# Patient Record
Sex: FEMALE | Race: WHITE | NOT HISPANIC OR LATINO | Employment: FULL TIME | URBAN - METROPOLITAN AREA
[De-identification: names, ages, dates, MRNs, and addresses within clinical notes are randomized per-mention and may not be internally consistent; named-entity substitution may affect disease eponyms.]

---

## 2017-02-15 ENCOUNTER — OFFICE VISIT - HEALTHEAST (OUTPATIENT)
Dept: FAMILY MEDICINE | Facility: CLINIC | Age: 27
End: 2017-02-15

## 2017-02-15 DIAGNOSIS — J02.9 ACUTE PHARYNGITIS: ICD-10-CM

## 2017-02-15 ASSESSMENT — MIFFLIN-ST. JEOR: SCORE: 1517.7

## 2017-03-01 ENCOUNTER — OFFICE VISIT - HEALTHEAST (OUTPATIENT)
Dept: FAMILY MEDICINE | Facility: CLINIC | Age: 27
End: 2017-03-01

## 2017-03-01 DIAGNOSIS — J06.9 ACUTE URI: ICD-10-CM

## 2017-06-01 ENCOUNTER — OFFICE VISIT - HEALTHEAST (OUTPATIENT)
Dept: FAMILY MEDICINE | Facility: CLINIC | Age: 27
End: 2017-06-01

## 2017-06-01 DIAGNOSIS — Z12.4 PAP SMEAR FOR CERVICAL CANCER SCREENING: ICD-10-CM

## 2017-06-01 DIAGNOSIS — Z00.00 ROUTINE GENERAL MEDICAL EXAMINATION AT A HEALTH CARE FACILITY: ICD-10-CM

## 2017-06-01 ASSESSMENT — MIFFLIN-ST. JEOR: SCORE: 1535.28

## 2017-06-02 LAB
CHOLEST SERPL-MCNC: 160 MG/DL
FASTING STATUS PATIENT QL REPORTED: YES
HDLC SERPL-MCNC: 49 MG/DL
LDLC SERPL CALC-MCNC: 91 MG/DL
TRIGL SERPL-MCNC: 102 MG/DL

## 2017-06-07 ENCOUNTER — AMBULATORY - HEALTHEAST (OUTPATIENT)
Dept: FAMILY MEDICINE | Facility: CLINIC | Age: 27
End: 2017-06-07

## 2017-06-07 LAB
BKR LAB AP ABNORMAL BLEEDING: NO
BKR LAB AP BIRTH CONTROL/HORMONES: NORMAL
BKR LAB AP CERVICAL APPEARANCE: NORMAL
BKR LAB AP GYN ADEQUACY: NORMAL
BKR LAB AP GYN INTERPRETATION: NORMAL
BKR LAB AP HPV REFLEX: NORMAL
BKR LAB AP LMP: NORMAL
BKR LAB AP PATIENT STATUS: NORMAL
BKR LAB AP PREVIOUS ABNORMAL: NORMAL
BKR LAB AP PREVIOUS NORMAL: 2015
HIGH RISK?: NO
PATH REPORT.COMMENTS IMP SPEC: NORMAL
RESULT FLAG (HE HISTORICAL CONVERSION): NORMAL

## 2017-07-06 ENCOUNTER — AMBULATORY - HEALTHEAST (OUTPATIENT)
Dept: FAMILY MEDICINE | Facility: CLINIC | Age: 27
End: 2017-07-06

## 2017-07-06 ENCOUNTER — COMMUNICATION - HEALTHEAST (OUTPATIENT)
Dept: FAMILY MEDICINE | Facility: CLINIC | Age: 27
End: 2017-07-06

## 2017-07-06 DIAGNOSIS — R10.2 PELVIC PAIN: ICD-10-CM

## 2017-07-07 ENCOUNTER — HOSPITAL ENCOUNTER (OUTPATIENT)
Dept: ULTRASOUND IMAGING | Facility: CLINIC | Age: 27
Discharge: HOME | End: 2017-07-07
Attending: FAMILY MEDICINE

## 2017-07-07 DIAGNOSIS — R10.2 PELVIC PAIN: ICD-10-CM

## 2017-07-13 ENCOUNTER — OFFICE VISIT - HEALTHEAST (OUTPATIENT)
Dept: FAMILY MEDICINE | Facility: CLINIC | Age: 27
End: 2017-07-13

## 2017-07-13 DIAGNOSIS — N91.2 AMENORRHEA: ICD-10-CM

## 2018-03-01 ENCOUNTER — OFFICE VISIT - HEALTHEAST (OUTPATIENT)
Dept: FAMILY MEDICINE | Facility: CLINIC | Age: 28
End: 2018-03-01

## 2018-03-01 DIAGNOSIS — R30.0 BURNING WITH URINATION: ICD-10-CM

## 2018-03-01 DIAGNOSIS — N76.0 VAGINITIS: ICD-10-CM

## 2018-03-01 LAB
ALBUMIN UR-MCNC: NEGATIVE MG/DL
APPEARANCE UR: CLEAR
BILIRUB UR QL STRIP: NEGATIVE
CLUE CELLS: NORMAL
COLOR UR AUTO: YELLOW
GLUCOSE UR STRIP-MCNC: NEGATIVE MG/DL
HGB UR QL STRIP: NEGATIVE
KETONES UR STRIP-MCNC: NEGATIVE MG/DL
LEUKOCYTE ESTERASE UR QL STRIP: NEGATIVE
NITRATE UR QL: NEGATIVE
PH UR STRIP: 7 [PH] (ref 5–8)
SP GR UR STRIP: 1.01 (ref 1–1.03)
TRICHOMONAS, WET PREP: NORMAL
UROBILINOGEN UR STRIP-ACNC: NORMAL
YEAST, WET PREP: NORMAL

## 2018-03-01 ASSESSMENT — MIFFLIN-ST. JEOR: SCORE: 1571.57

## 2018-03-04 LAB — BACTERIA SPEC CULT: ABNORMAL

## 2018-03-05 ENCOUNTER — COMMUNICATION - HEALTHEAST (OUTPATIENT)
Dept: FAMILY MEDICINE | Facility: CLINIC | Age: 28
End: 2018-03-05

## 2018-03-05 ENCOUNTER — AMBULATORY - HEALTHEAST (OUTPATIENT)
Dept: FAMILY MEDICINE | Facility: CLINIC | Age: 28
End: 2018-03-05

## 2018-07-17 ENCOUNTER — COMMUNICATION - HEALTHEAST (OUTPATIENT)
Dept: FAMILY MEDICINE | Facility: CLINIC | Age: 28
End: 2018-07-17

## 2018-07-17 ENCOUNTER — OFFICE VISIT - HEALTHEAST (OUTPATIENT)
Dept: FAMILY MEDICINE | Facility: CLINIC | Age: 28
End: 2018-07-17

## 2018-07-17 DIAGNOSIS — N92.6 IRREGULAR PERIODS: ICD-10-CM

## 2018-07-17 DIAGNOSIS — N97.9 INFERTILITY, FEMALE: ICD-10-CM

## 2018-07-17 LAB
ALBUMIN SERPL-MCNC: 4.3 G/DL (ref 3.5–5)
ALP SERPL-CCNC: 56 U/L (ref 45–120)
ALT SERPL W P-5'-P-CCNC: 21 U/L (ref 0–45)
ANION GAP SERPL CALCULATED.3IONS-SCNC: 14 MMOL/L (ref 5–18)
AST SERPL W P-5'-P-CCNC: 15 U/L (ref 0–40)
BILIRUB SERPL-MCNC: 0.5 MG/DL (ref 0–1)
BUN SERPL-MCNC: 11 MG/DL (ref 8–22)
CALCIUM SERPL-MCNC: 9.7 MG/DL (ref 8.5–10.5)
CHLORIDE BLD-SCNC: 105 MMOL/L (ref 98–107)
CO2 SERPL-SCNC: 23 MMOL/L (ref 22–31)
CREAT SERPL-MCNC: 0.7 MG/DL (ref 0.6–1.1)
ERYTHROCYTE [DISTWIDTH] IN BLOOD BY AUTOMATED COUNT: 11.6 % (ref 11–14.5)
FSH SERPL-ACNC: 3.3 MIU/ML
GFR SERPL CREATININE-BSD FRML MDRD: >60 ML/MIN/1.73M2
GLUCOSE BLD-MCNC: 90 MG/DL (ref 70–125)
HCG SERPL QL: NEGATIVE
HCT VFR BLD AUTO: 45.4 % (ref 35–47)
HGB BLD-MCNC: 15.2 G/DL (ref 12–16)
LH SERPL-ACNC: 3.9 MIU/ML
MCH RBC QN AUTO: 29.1 PG (ref 27–34)
MCHC RBC AUTO-ENTMCNC: 33.4 G/DL (ref 32–36)
MCV RBC AUTO: 87 FL (ref 80–100)
PLATELET # BLD AUTO: 222 THOU/UL (ref 140–440)
PMV BLD AUTO: 8.8 FL (ref 7–10)
POTASSIUM BLD-SCNC: 4 MMOL/L (ref 3.5–5)
PROT SERPL-MCNC: 7.8 G/DL (ref 6–8)
RBC # BLD AUTO: 5.21 MILL/UL (ref 3.8–5.4)
SODIUM SERPL-SCNC: 142 MMOL/L (ref 136–145)
TSH SERPL DL<=0.005 MIU/L-ACNC: 2.85 UIU/ML (ref 0.3–5)
WBC: 6.4 THOU/UL (ref 4–11)

## 2018-07-19 LAB — DHEA-S SERPL-MCNC: 416 UG/DL (ref 65–380)

## 2018-07-20 ENCOUNTER — AMBULATORY - HEALTHEAST (OUTPATIENT)
Dept: LAB | Facility: CLINIC | Age: 28
End: 2018-07-20

## 2018-07-20 DIAGNOSIS — N92.6 IRREGULAR PERIODS: ICD-10-CM

## 2018-07-20 LAB
ESTRADIOL SERPL-MCNC: 21 PG/ML
FSH SERPL-ACNC: 5 MIU/ML
PROLACTIN SERPL-MCNC: 19.5 NG/ML (ref 0–20)
SHBG SERPL-SCNC: 42 NMOL/L (ref 30–135)
TESTOST FREE SERPL-MCNC: 0.36 NG/DL (ref 0.08–0.74)
TESTOST SERPL-MCNC: 24 NG/DL (ref 8–60)

## 2018-07-22 LAB — MIS SERPL-MCNC: 8.47 NG/ML (ref 0.4–16.02)

## 2018-07-23 ENCOUNTER — HOSPITAL ENCOUNTER (OUTPATIENT)
Dept: RADIOLOGY | Facility: CLINIC | Age: 28
Discharge: HOME | End: 2018-07-23
Attending: FAMILY MEDICINE | Admitting: RADIOLOGY

## 2018-07-23 DIAGNOSIS — N92.6 IRREGULAR PERIODS: ICD-10-CM

## 2018-07-29 ENCOUNTER — OFFICE VISIT - HEALTHEAST (OUTPATIENT)
Dept: FAMILY MEDICINE | Facility: CLINIC | Age: 28
End: 2018-07-29

## 2018-07-29 DIAGNOSIS — L03.011 CELLULITIS OF RIGHT THUMB: ICD-10-CM

## 2018-09-16 ENCOUNTER — OFFICE VISIT - HEALTHEAST (OUTPATIENT)
Dept: FAMILY MEDICINE | Facility: CLINIC | Age: 28
End: 2018-09-16

## 2018-09-16 DIAGNOSIS — L25.9 CONTACT DERMATITIS: ICD-10-CM

## 2018-09-18 ENCOUNTER — OFFICE VISIT - HEALTHEAST (OUTPATIENT)
Dept: FAMILY MEDICINE | Facility: CLINIC | Age: 28
End: 2018-09-18

## 2018-09-18 ENCOUNTER — COMMUNICATION - HEALTHEAST (OUTPATIENT)
Dept: FAMILY MEDICINE | Facility: CLINIC | Age: 28
End: 2018-09-18

## 2018-09-18 DIAGNOSIS — H02.846 SWELLING OF EYELID, LEFT: ICD-10-CM

## 2018-09-18 DIAGNOSIS — H10.32 ACUTE CONJUNCTIVITIS OF LEFT EYE: ICD-10-CM

## 2018-09-20 ENCOUNTER — COMMUNICATION - HEALTHEAST (OUTPATIENT)
Dept: FAMILY MEDICINE | Facility: CLINIC | Age: 28
End: 2018-09-20

## 2019-07-31 ENCOUNTER — RECORDS - HEALTHEAST (OUTPATIENT)
Dept: ADMINISTRATIVE | Facility: OTHER | Age: 29
End: 2019-07-31

## 2019-08-07 ENCOUNTER — OFFICE VISIT - HEALTHEAST (OUTPATIENT)
Dept: RHEUMATOLOGY | Facility: CLINIC | Age: 29
End: 2019-08-07

## 2019-08-07 DIAGNOSIS — H20.9 IRITIS OF LEFT EYE: ICD-10-CM

## 2019-08-07 LAB
ALBUMIN SERPL-MCNC: 4.2 G/DL (ref 3.5–5)
ALT SERPL W P-5'-P-CCNC: 17 U/L (ref 0–45)
BASOPHILS # BLD AUTO: 0 THOU/UL (ref 0–0.2)
BASOPHILS NFR BLD AUTO: 1 % (ref 0–2)
CREAT SERPL-MCNC: 0.66 MG/DL (ref 0.6–1.1)
EOSINOPHIL # BLD AUTO: 0.1 THOU/UL (ref 0–0.4)
EOSINOPHIL NFR BLD AUTO: 1 % (ref 0–6)
ERYTHROCYTE [DISTWIDTH] IN BLOOD BY AUTOMATED COUNT: 11.1 % (ref 11–14.5)
GFR SERPL CREATININE-BSD FRML MDRD: >60 ML/MIN/1.73M2
HCT VFR BLD AUTO: 43.2 % (ref 35–47)
HGB BLD-MCNC: 14.6 G/DL (ref 12–16)
LYMPHOCYTES # BLD AUTO: 1.7 THOU/UL (ref 0.8–4.4)
LYMPHOCYTES NFR BLD AUTO: 28 % (ref 20–40)
MCH RBC QN AUTO: 29.8 PG (ref 27–34)
MCHC RBC AUTO-ENTMCNC: 33.8 G/DL (ref 32–36)
MCV RBC AUTO: 88 FL (ref 80–100)
MONOCYTES # BLD AUTO: 0.4 THOU/UL (ref 0–0.9)
MONOCYTES NFR BLD AUTO: 7 % (ref 2–10)
NEUTROPHILS # BLD AUTO: 3.7 THOU/UL (ref 2–7.7)
NEUTROPHILS NFR BLD AUTO: 63 % (ref 50–70)
PLATELET # BLD AUTO: 186 THOU/UL (ref 140–440)
PMV BLD AUTO: 9.6 FL (ref 7–10)
RBC # BLD AUTO: 4.89 MILL/UL (ref 3.8–5.4)
RHEUMATOID FACT SERPL-ACNC: <15 IU/ML (ref 0–30)
WBC: 5.9 THOU/UL (ref 4–11)

## 2019-08-07 ASSESSMENT — MIFFLIN-ST. JEOR: SCORE: 1557.96

## 2019-08-08 LAB
ANA SER QL: 0.3 U
B BURGDOR IGG+IGM SER QL: 0.04 INDEX VALUE
CCP AB SER IA-ACNC: <0.5 U/ML

## 2019-08-09 ENCOUNTER — RECORDS - HEALTHEAST (OUTPATIENT)
Dept: ADMINISTRATIVE | Facility: OTHER | Age: 29
End: 2019-08-09

## 2019-08-09 LAB
ANCA IGG TITR SER IF: NORMAL {TITER}
GAMMA INTERFERON BACKGROUND BLD IA-ACNC: 0.03 IU/ML
M TB IFN-G BLD-IMP: NEGATIVE
MITOGEN IGNF BCKGRD COR BLD-ACNC: -0.01 IU/ML
MITOGEN IGNF BCKGRD COR BLD-ACNC: -0.02 IU/ML
QTF INTERPRETATION: NORMAL
QTF MITOGEN - NIL: >10 IU/ML

## 2019-08-13 LAB
B LOCUS: NORMAL
B27TEST METHOD: NORMAL

## 2019-11-14 ENCOUNTER — OFFICE VISIT - HEALTHEAST (OUTPATIENT)
Dept: RHEUMATOLOGY | Facility: CLINIC | Age: 29
End: 2019-11-14

## 2019-11-14 DIAGNOSIS — H20.9 IRITIS: ICD-10-CM

## 2019-11-14 ASSESSMENT — MIFFLIN-ST. JEOR: SCORE: 1565.22

## 2020-06-19 ENCOUNTER — COMMUNICATION - HEALTHEAST (OUTPATIENT)
Dept: SCHEDULING | Facility: CLINIC | Age: 30
End: 2020-06-19

## 2020-06-19 DIAGNOSIS — N64.4 BREAST PAIN: ICD-10-CM

## 2020-07-06 ENCOUNTER — RECORDS - HEALTHEAST (OUTPATIENT)
Dept: ADMINISTRATIVE | Facility: OTHER | Age: 30
End: 2020-07-06

## 2020-10-13 ENCOUNTER — OFFICE VISIT - HEALTHEAST (OUTPATIENT)
Dept: FAMILY MEDICINE | Facility: CLINIC | Age: 30
End: 2020-10-13

## 2020-10-13 DIAGNOSIS — Z20.822 SUSPECTED 2019 NOVEL CORONAVIRUS INFECTION: ICD-10-CM

## 2020-10-14 ENCOUNTER — RECORDS - HEALTHEAST (OUTPATIENT)
Dept: LAB | Facility: CLINIC | Age: 30
End: 2020-10-14

## 2020-10-14 LAB
SARS-COV-2 PCR COMMENT: NORMAL
SARS-COV-2 RNA SPEC QL NAA+PROBE: NEGATIVE
SARS-COV-2 VIRUS SPECIMEN SOURCE: NORMAL

## 2020-11-03 ENCOUNTER — RECORDS - HEALTHEAST (OUTPATIENT)
Dept: LAB | Facility: CLINIC | Age: 30
End: 2020-11-03

## 2020-11-03 LAB
SARS-COV-2 PCR COMMENT: ABNORMAL
SARS-COV-2 RNA SPEC QL NAA+PROBE: POSITIVE
SARS-COV-2 VIRUS SPECIMEN SOURCE: ABNORMAL

## 2021-03-01 ENCOUNTER — HOSPITAL ENCOUNTER (OUTPATIENT)
Dept: OBGYN | Facility: HOSPITAL | Age: 31
Discharge: HOME | End: 2021-03-01
Attending: OBSTETRICS & GYNECOLOGY | Admitting: OBSTETRICS & GYNECOLOGY

## 2021-03-01 LAB
ABO/RH(D): NORMAL
ALBUMIN UR-MCNC: NEGATIVE MG/DL
ALT SERPL W P-5'-P-CCNC: 30 U/L (ref 0–45)
ANTIBODY SCREEN: NEGATIVE
APTT PPP: 27 SECONDS (ref 24–37)
AST SERPL W P-5'-P-CCNC: 19 U/L (ref 0–40)
CREAT SERPL-MCNC: 0.52 MG/DL (ref 0.6–1.1)
CREAT UR-MCNC: 20.7 MG/DL
ERYTHROCYTE [DISTWIDTH] IN BLOOD BY AUTOMATED COUNT: 14 % (ref 11–14.5)
GFR SERPL CREATININE-BSD FRML MDRD: >60 ML/MIN/1.73M2
GLUCOSE UR STRIP-MCNC: NEGATIVE MG/DL
HCT VFR BLD AUTO: 40.4 % (ref 35–47)
HGB BLD-MCNC: 13.5 G/DL (ref 12–16)
INR PPP: 0.99 (ref 0.9–1.1)
KETONES UR STRIP-MCNC: NEGATIVE MG/DL
MCH RBC QN AUTO: 29.4 PG (ref 27–34)
MCHC RBC AUTO-ENTMCNC: 33.4 G/DL (ref 32–36)
MCV RBC AUTO: 88 FL (ref 80–100)
PLATELET # BLD AUTO: 203 THOU/UL (ref 140–440)
PMV BLD AUTO: 11.5 FL (ref 8.5–12.5)
PROTEIN, RANDOM URINE - HISTORICAL: <7 MG/DL
PROTEIN/CREAT RATIO, RANDOM UR: NORMAL
RBC # BLD AUTO: 4.59 MILL/UL (ref 3.8–5.4)
URATE SERPL-MCNC: 5 MG/DL (ref 2–7.5)
WBC: 12.2 THOU/UL (ref 4–11)

## 2021-03-01 ASSESSMENT — MIFFLIN-ST. JEOR: SCORE: 1712.75

## 2021-04-16 ENCOUNTER — AMBULATORY - HEALTHEAST (OUTPATIENT)
Dept: OBGYN | Facility: CLINIC | Age: 31
End: 2021-04-16

## 2021-04-16 ENCOUNTER — AMBULATORY - HEALTHEAST (OUTPATIENT)
Dept: LAB | Facility: CLINIC | Age: 31
End: 2021-04-16

## 2021-04-16 DIAGNOSIS — O13.3 PREGNANCY-INDUCED HYPERTENSION IN THIRD TRIMESTER: ICD-10-CM

## 2021-04-18 ENCOUNTER — COMMUNICATION - HEALTHEAST (OUTPATIENT)
Dept: SCHEDULING | Facility: CLINIC | Age: 31
End: 2021-04-18

## 2021-04-20 ENCOUNTER — HOSPITAL ENCOUNTER (OUTPATIENT)
Dept: OBGYN | Facility: CLINIC | Age: 31
Discharge: HOME | End: 2021-04-20

## 2021-04-21 ENCOUNTER — ANESTHESIA - HEALTHEAST (OUTPATIENT)
Dept: OBGYN | Facility: CLINIC | Age: 31
End: 2021-04-21

## 2021-04-25 ENCOUNTER — COMMUNICATION - HEALTHEAST (OUTPATIENT)
Dept: SCHEDULING | Facility: CLINIC | Age: 31
End: 2021-04-25

## 2021-05-19 ENCOUNTER — AMBULATORY - HEALTHEAST (OUTPATIENT)
Dept: NURSING | Facility: CLINIC | Age: 31
End: 2021-05-19

## 2021-05-30 VITALS — WEIGHT: 163 LBS | HEIGHT: 69 IN | BODY MASS INDEX: 24.14 KG/M2

## 2021-05-30 VITALS — BODY MASS INDEX: 23.77 KG/M2 | HEIGHT: 70 IN | WEIGHT: 166 LBS

## 2021-05-30 VITALS — WEIGHT: 166.9 LBS | BODY MASS INDEX: 24.47 KG/M2

## 2021-05-31 VITALS — BODY MASS INDEX: 24.91 KG/M2 | HEIGHT: 70 IN | WEIGHT: 174 LBS

## 2021-05-31 VITALS — BODY MASS INDEX: 24.34 KG/M2 | WEIGHT: 167.2 LBS

## 2021-05-31 NOTE — PROGRESS NOTES
"ASSESSMENT AND PLAN:  Gwyn Quintanilla 29 y.o. female is seen here on 08/07/19 for evaluation of iritis.  She is well controlled as long as she is using the topical corticosteroid drops.  Today we had a detailed discussion.  I have outlined to her that from a rheumatologic perspective there are 2 dimensions.  #1 is to see if there is an associated condition that she may have.  Whether her father's history of Crohn's may be playing a part is to be kept under consideration.  It does not appear that she has any of the usual associations with the iritis.  Further work-up will be undertaken as noted.  The second dimension I outlined to her is that of therapeutic intervention if and when she and or her daughter ophthalmologist decide that the steroid drops are not working or have required to longer course.  In such situations DMARDs, Biologics may be considered.  Will meet here in 3 months unless needed sooner.  Diagnoses and all orders for this visit:    Iritis of left eye  -     HM1(CBC and Differential)  -     Creatinine  -     ALT (SGPT)  -     Albumin  -     Rheumatoid Factor Quant  -     CCP Antibodies  -     Lyme Antibody Cascade  -     QTF-Mycobacterium tuberculosis by QuantiFERON-TB Gold Plus  -     Anti-Neutrophil Cytoplasmic Antibody, IgG (ANCA IFA)  -     Antinuclear Antibody (IAN) Cascade  -     HLA-B27 Antigen  -     HM1 (CBC with Diff)      HISTORY OF PRESENTING ILLNESS:  Gwyn Quintanilla, 29 y.o., female is here for evaluation of iritis from a rheumatologic perspective.  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  She reports that her eye symptoms began on 4/23/2019.  Over the past months and she has tried to taper the corticosteroid drops in each time that has been done there is a flareup.  She has been told that the severity of her iritis is however \"mild\".  As for as she is aware the right side is not affected.  She describes herself otherwise in good health.  She had been taking Clomid for helping " with infertility.  Dad noted to have Crohn's.  She has not had persistent rash although she recalls that when she first developed the iritis there was a rash on her left forearm on the volar surface and neck area that has resolved since.  She reports no mouth ulcers, photosensitivity, pleuritic symptoms, history of miscarriage, she is reported no significant joint symptoms some neck discomfort however.  Her morning stiffness is minimal less than 5 minutes.  She is able to continue to do all her day-to-day activities without difficulty.  She has noted fatigue.  She has not noted features such as one would expect an dactylitis, erythema nodosum.  There is no history of travels to high risk tuberculosis area.  There is no history of Lyme disease.  She has not had genital ulcers, DVT PE, or other skin lesions.  Her understanding is that her ethnicity he is [place]/Scandinavian.  She is not aware of any Mediterranean heritage.  Further historical information and ADL limitations as noted in the multidimensional health assessment questionnaire attached in the EMR. Rest of the 13 system ROS is negative.     ALLERGIES:Patient has no known allergies.    PAST MEDICAL/ACTIVE PROBLEMS/MEDICATION/ FAMILY HISTORY/SOCIAL DATA:  The patient has a family history of  Past Medical History:   Diagnosis Date     Abnormal Pap smear of cervix      Abnormal vaginal Pap smear      Anxiety      Astigmatism      Cervical dysplasia     CINDY I     Depression      Excessive sweating      H/O colposcopy with cervical biopsy      Keratosis pilaris      Myopia      Social History     Tobacco Use   Smoking Status Never Smoker   Smokeless Tobacco Never Used     There is no problem list on file for this patient.    Current Outpatient Medications   Medication Sig Dispense Refill     artificial tears,hypromellose, (GENTEAL; SYSTANE) 0.3 % Gel Administer to both eyes.       brimonidine-timolol (COMBIGAN) 0.2-0.5 % ophthalmic solution Administer 1 drop into  "the left eye 2 (two) times a day as needed.       brinzolamide (AZOPT) 1 % ophthalmic suspension Administer 1 drop into the left eye 2 (two) times a day as needed.       cetirizine (ZYRTEC) 10 MG tablet Take 10 mg by mouth daily.       ibuprofen (ADVIL,MOTRIN) 200 MG tablet Take 200 mg by mouth every 6 (six) hours as needed.       prednisoLONE acetate (PRED-FORTE) 1 % ophthalmic suspension 4 (four) times a day.  2     No current facility-administered medications for this visit.        COMPREHENSIVE EXAMINATION:  Vitals:    08/07/19 1012   BP: 120/88   Patient Site: Right Arm   Patient Position: Sitting   Cuff Size: Adult Regular   Pulse: 84   Weight: 171 lb (77.6 kg)   Height: 5' 9.5\" (1.765 m)     A well appearing alert oriented female. Vital data as noted above. Her eyes without inflammation/scleromalacia. ENTwithout oral mucositis, thrush, nasal deformity, external ear redness, deformity. Her neck is without lymphadenopathy and supple. Lungs normal sounds, no pleural rub. Heart auscultation normal rate, rhythm; no pericardial rub and murmurs. Abdomen soft, non tender, no organomegaly. Skin examined for heliotrope, malar area eruption, lupus pernio, periungual erythema, sclerodactyly, papules, erythema nodosum, purpura, nail pitting, onycholysis, and obvious psoriasis lesion. Neurological examination shows normal alertness, speech, facial symmetry, tone and power in upper and lower extremities. The joint examination is performed for swelling, tenderness, warmth, erythema, and range of motion in the following joints: DIPs, PIPs, MCPs, wrists, first CMC's, elbows, shoulders, hips, knees, ankles, feet; spine for range of motion and paraspinal muscles for tenderness. The salient  findings are: There is no synovitis in any of the palpable joints of upper or lower extremities.  She does not have dactylitis.  There is no erythema nodosum.  There is no mouth ulcer.  She does not have pleuropericardial rub.  She does not " have a wrist or foot drop.  Gait is normal.  Her occiput to wall distance is 0.  She does not have enthesitis such as around the knees, ankles.    LAB / IMAGING DATA:  ALT   Date Value Ref Range Status   07/17/2018 21 0 - 45 U/L Final     Albumin   Date Value Ref Range Status   07/17/2018 4.3 3.5 - 5.0 g/dL Final     Creatinine   Date Value Ref Range Status   07/17/2018 0.70 0.60 - 1.10 mg/dL Final   06/01/2017 0.73 0.60 - 1.10 mg/dL Final       WBC   Date Value Ref Range Status   07/17/2018 6.4 4.0 - 11.0 thou/uL Final   06/01/2017 5.9 4.0 - 11.0 thou/uL Final     Hemoglobin   Date Value Ref Range Status   07/17/2018 15.2 12.0 - 16.0 g/dL Final   06/01/2017 15.1 12.0 - 16.0 g/dL Final     Platelets   Date Value Ref Range Status   07/17/2018 222 140 - 440 thou/uL Final   06/01/2017 208 140 - 440 thou/uL Final       No results found for: IAN, RF, SEDRATE

## 2021-06-01 VITALS — WEIGHT: 168.5 LBS | BODY MASS INDEX: 24.53 KG/M2

## 2021-06-01 VITALS — WEIGHT: 177.6 LBS | BODY MASS INDEX: 25.85 KG/M2

## 2021-06-01 VITALS — WEIGHT: 175.9 LBS | BODY MASS INDEX: 25.6 KG/M2

## 2021-06-01 VITALS — WEIGHT: 167 LBS | BODY MASS INDEX: 24.31 KG/M2

## 2021-06-03 VITALS — WEIGHT: 171 LBS | BODY MASS INDEX: 24.48 KG/M2 | HEIGHT: 70 IN

## 2021-06-03 VITALS
HEIGHT: 70 IN | HEART RATE: 84 BPM | WEIGHT: 172.6 LBS | DIASTOLIC BLOOD PRESSURE: 88 MMHG | BODY MASS INDEX: 24.71 KG/M2 | SYSTOLIC BLOOD PRESSURE: 122 MMHG

## 2021-06-03 NOTE — PROGRESS NOTES
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  She is no longer taking topical drops from her ophthalmologist.  There does not appear to be indication especially understands from her ophthalmologist for immunosuppression.  Therefore she will follow-up here on as-needed basis.      Diagnoses and all orders for this visit:    Iritis      HISTORY OF PRESENTING ILLNESS:  Gwyn Quintanilla, 29 y.o., female is here for follow-up of  iritis from a rheumatologic perspective.  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  She reports that her eye symptoms began on 4/23/2019.  Over the past months and she has tried to taper the corticosteroid drops in each time that has been done there is a flareup.  She has been told that the severity of her iritis is however \"mild\".  As for as she is aware the right side is not affected.  She describes herself otherwise in good health.  She had been taking Clomid for helping with infertility.  Dad noted to have Crohn's.  She has not had persistent rash although she recalls that when she first developed the iritis there was a rash on her left forearm on the volar surface and neck area that has resolved since.  She reports no mouth ulcers, photosensitivity, pleuritic symptoms, history of miscarriage, she is reported no significant joint symptoms some neck discomfort however.  Her morning stiffness is minimal less than 5 minutes.  She is able to continue to do all her day-to-day activities without difficulty.  She has noted fatigue.  She has not noted features such as one would expect an dactylitis, erythema nodosum.  There is no history of travels to high risk tuberculosis area.  There is no history of Lyme disease.  She has not had genital ulcers, DVT PE, or other skin lesions.  Her " "understanding is that her ethnicity he is Polish/Scandinavian.  She is not aware of any Mediterranean heritage.  Further historical information and ADL limitations as noted in the multidimensional health assessment questionnaire attached in the EMR.  ALLERGIES:Patient has no known allergies.    PAST MEDICAL/ACTIVE PROBLEMS/MEDICATION/ FAMILY HISTORY/SOCIAL DATA:  The patient has a family history of  Past Medical History:   Diagnosis Date     Abnormal Pap smear of cervix      Abnormal vaginal Pap smear      Anxiety      Astigmatism      Cervical dysplasia     CINDY I     Depression      Excessive sweating      H/O colposcopy with cervical biopsy      Keratosis pilaris      Myopia      Social History     Tobacco Use   Smoking Status Never Smoker   Smokeless Tobacco Never Used     There is no problem list on file for this patient.    Current Outpatient Medications   Medication Sig Dispense Refill     artificial tears,hypromellose, (GENTEAL; SYSTANE) 0.3 % Gel Administer to both eyes.       MULTIVITAMIN ORAL Take by mouth daily.       cetirizine (ZYRTEC) 10 MG tablet Take 10 mg by mouth daily.       ibuprofen (ADVIL,MOTRIN) 200 MG tablet Take 200 mg by mouth every 6 (six) hours as needed.       No current facility-administered medications for this visit.      DETAILED EXAMINATION  11/14/19  :  Vitals:    11/14/19 1127   BP: 122/88   Patient Site: Right Arm   Patient Position: Sitting   Cuff Size: Adult Regular   Pulse: 84   Weight: 172 lb 9.6 oz (78.3 kg)   Height: 5' 9.5\" (1.765 m)     Alert oriented.  Her eyes without inflammatory changes.         LAB / IMAGING DATA:  ALT   Date Value Ref Range Status   08/07/2019 17 0 - 45 U/L Final   07/17/2018 21 0 - 45 U/L Final     Albumin   Date Value Ref Range Status   08/07/2019 4.2 3.5 - 5.0 g/dL Final   07/17/2018 4.3 3.5 - 5.0 g/dL Final     Creatinine   Date Value Ref Range Status   08/07/2019 0.66 0.60 - 1.10 mg/dL Final   07/17/2018 0.70 0.60 - 1.10 mg/dL Final "   06/01/2017 0.73 0.60 - 1.10 mg/dL Final       WBC   Date Value Ref Range Status   08/07/2019 5.9 4.0 - 11.0 thou/uL Final   07/17/2018 6.4 4.0 - 11.0 thou/uL Final     Hemoglobin   Date Value Ref Range Status   08/07/2019 14.6 12.0 - 16.0 g/dL Final   07/17/2018 15.2 12.0 - 16.0 g/dL Final   06/01/2017 15.1 12.0 - 16.0 g/dL Final     Platelets   Date Value Ref Range Status   08/07/2019 186 140 - 440 thou/uL Final   07/17/2018 222 140 - 440 thou/uL Final   06/01/2017 208 140 - 440 thou/uL Final       Lab Results   Component Value Date    RF <15.0 08/07/2019

## 2021-06-05 VITALS — WEIGHT: 205.13 LBS | BODY MASS INDEX: 29.37 KG/M2 | HEIGHT: 70 IN

## 2021-06-08 NOTE — PROGRESS NOTES
ASSESSMENT/PLAN:     1. Acute pharyngitis  We talked about options since both her rapid strep and influenza testing is negative.  Given that she has been exposed to both and her clinical exam is most suggestive of strep throat, I recommended treatment with amoxicillin full dose and Tamiflu daily for 10 days as a preventative.  Patient agrees with the plan.  She will let us know if she has any problems.  - Rapid Strep A Screen-Throat  - Group A Strep, RNA Direct Detection, Throat  - Influenza A/B Rapid Test  - amoxicillin (AMOXIL) 500 MG capsule; Take 1 capsule (500 mg total) by mouth 3 (three) times a day for 10 days.  Dispense: 30 capsule; Refill: 0  - oseltamivir (TAMIFLU) 75 MG capsule; Take 1 capsule (75 mg total) by mouth daily for 5 days.  Dispense: 10 capsule; Refill: 0      Follow-up when necessary    CHIEF COMPLAINT  Chief Complaint   Patient presents with     Illness     ex posed to [facility] over the weekend - body aches, nasal congestion, sore thorat and fatigued       HPI:  Gwyn Quintanilla is a 26 y.o. female presenting to the clinic today with cold symptoms. She is normally seen by Dr. Mahan for care and is typically healthy overall. She reports that she awoke with ear pain yesterday, but felt well otherwise. She then awoke early this morning with terrible body aches, nasal congestion, and a sore throat causing pain with swallowing. She has been increasingly fatigued through the day, but her ear pain has resolved. 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       REVIEW OF SYSTEMS:   She recently had a tooth filling. She denies nausea or vomiting, but was lightheaded when walking into her bathroom after waking this morning. She is not sure if she has had a fever, but woke in sweats this morning. She has a slight tickle cough. All other systems negative.     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       TOBACCO USE:  History   Smoking Status     Never Smoker   Smokeless Tobacco     Not on file     Social History     Social History     Marital status:      Spouse name: N/A     Number of children: N/A     Years of education: N/A     Occupational History     Not on file.     Social History Main Topics     Smoking status: Never Smoker     Smokeless tobacco: Not on file     Alcohol use Yes     Drug use: No     Sexual activity: Yes     Birth control/ protection: Condom     Other Topics Concern     Not on file     Social History Narrative       OBJECTIVE:   Recent Results (from the past 240 hour(s))   Rapid Strep A Screen-Throat   Result Value Ref Range    Rapid Strep A Antigen No Group A Strep detected No Group A Strep detected   Group A Strep, RNA Direct Detection, Throat   Result Value Ref Range    Group A Strep by PCR No Group A Strep rRNA detected No Group A Strep rRNA detected   Influenza A/B Rapid Test   Result Value Ref Range    Influenza  A, Rapid Antigen No Influenza A antigen detected No Influenza A antigen detected    Influenza B, Rapid Antigen No Influenza B antigen detected No Influenza B antigen detected       Vitals:    02/15/17 1602   BP: 120/84   Pulse: 66   Resp: 16   Temp: 97.9  F (36.6  C)     Weight: 163 lb (73.9 kg)  Wt Readings from Last 3 Encounters:   02/15/17 163 lb (73.9 kg)   09/26/16 166 lb (75.3 kg)   03/31/16 173 lb (78.5 kg)     Body mass index is 23.9 kg/(m^2).      Physical Exam:  GENERAL APPEARANCE: A&A, NAD, well hydrated, well nourished  EARS: TM's normal, gray with nl light reflex  OROPHARYNX: brightly erythematous, but no exudate  NECK: Supple, tender with bilateral anterior lymphadenopathy, no thyroid mass  CV: No M/G/R. Slightly tachycardic, normal otherwise  LUNGS: CTAB, normal respiratory effort  PSYCHIATRIC;  Mood appropriate, memory intact      Elizabeth Baca MD    ADDITIONAL HISTORY SUMMARIZED (2): None.  DECISION TO  OBTAIN EXTRA INFORMATION (1): None.   RADIOLOGY TESTS (1): None.  LABS (1): Ordered labs.  MEDICINE TESTS (1): None.  INDEPENDENT REVIEW (2 each): None.     The visit lasted a total of 14 minutes face to face with the patient. Over 50% of the time was spent counseling and educating the patient about cold symptoms.    I, Veronica Mane, am scribing for and in the presence of, Dr. Baca.    I, Dr. Baca, personally performed the services described in this documentation, as scribed by Veronica Mane in my presence, and it is both accurate and complete.       MEDICATIONS   Current Outpatient Prescriptions   Medication Sig Dispense Refill     calcium 500 mg Tab Take 500 mg by mouth.       cholecalciferol, vitamin D3, (VITAMIN D3) 2,000 unit cap Take by mouth.       multivitamin (ONE A DAY) per tablet Take 1 tablet by mouth.       triamcinolone (KENALOG) 0.1 % lotion Apply topically.       amoxicillin (AMOXIL) 500 MG capsule Take 1 capsule (500 mg total) by mouth 3 (three) times a day for 10 days. 30 capsule 0     multivitamin (ONE A DAY) per tablet Take 1 tablet by mouth.       oseltamivir (TAMIFLU) 75 MG capsule Take 1 capsule (75 mg total) by mouth daily for 5 days. 10 capsule 0     prenatal #115-iron-folic acid 29 mg iron- 1 mg Chew Chew daily.       No current facility-administered medications for this visit.          Total data points: 1

## 2021-06-09 ENCOUNTER — AMBULATORY - HEALTHEAST (OUTPATIENT)
Dept: NURSING | Facility: CLINIC | Age: 31
End: 2021-06-09

## 2021-06-09 NOTE — PROGRESS NOTES
ASSESSMENT/PLAN:       1. Acute URI  She has had treatment for influenza as well as possible strep in the last few weeks. Currently no signs of an acute bacterial infection. Discussed supportive cares for now and let me know if things are not getting better with those including Mucinex and nasal saline rinses and we can try some Augmentin.   Given how long her symptoms are persisting I would worry about a sinus infection and would be quick to treat if things aren't getting better.   Patient is not preventing pregnancy at this time and given this I would like to avoid doxycycline and would have her try Augmentin instead.         Peg Mahan MD      PROGRESS NOTE   3/1/2017    SUBJECTIVE:  Gwyn Quintanilla is a 26 y.o. female  who presents for not feeling well.     She was here on 2/15 and saw , worked the weekend prior. 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 Due to her exposure she was prescribed Tamiflu for 10 days and Amox for 10 days for concern of strep. She does feel better but now has right sided ear pain and she feels swollen on the right side as well. She works this weekend again and is still having issues. Due to this she wants to make sure she isn't going to get a lot worse again.    She did start doing warm salt water rinses and it seems like her throat isn't quite as sore as it has been. That seems to be helping some.      She has had no fevers. She does feel like she is running hot at times. She has not had a runny/stuffy nose.She is coughing, is not bringing anythign up. She is sleeping okayish. Energy level is getting better. She has not had any headaches. She does not have any pressure in her sinuses. She has been using Sudafed quite a bit, not sure if it helps.   Chief Complaint   Patient presents with     Follow-up     Patient was seen in early Feb for a positive exposure to flu. Patient has finished her antibiotic but is  now having right sided ear pain that leads into the right side of the throat.          There is no problem list on file for this patient.      Current Outpatient Prescriptions   Medication Sig Dispense Refill     calcium 500 mg Tab Take 500 mg by mouth.       cholecalciferol, vitamin D3, (VITAMIN D3) 2,000 unit cap Take by mouth.       multivitamin (ONE A DAY) per tablet Take 1 tablet by mouth.       multivitamin (ONE A DAY) per tablet Take 1 tablet by mouth.       prenatal #115-iron-folic acid 29 mg iron- 1 mg Chew Chew daily.       triamcinolone (KENALOG) 0.1 % lotion Apply topically.       No current facility-administered medications for this visit.        History   Smoking Status     Never Smoker   Smokeless Tobacco     Not on file           OBJECTIVE:        No results found for this or any previous visit (from the past 240 hour(s)).    Vitals:    03/01/17 1300   BP: 128/78   Patient Site: Left Arm   Patient Position: Sitting   Cuff Size: Adult Regular   Pulse: 86   Temp: 98.2  F (36.8  C)   TempSrc: Oral   SpO2: 98%   Weight: 166 lb 14.4 oz (75.7 kg)     Weight: 166 lb 14.4 oz (75.7 kg)        Physical Exam:  GENERAL APPEARANCE: A&A, NAD, well hydrated, well nourished  SKIN:  Normal skin turgor, no lesions/rashes   HEENT: moist mucous membranes, no rhinorrhea, TMs clear bilaterally, suggestion of clear fluid on the right, pharynx without erythema, sinuses are non-tender to palpation.   NECK: Normal  CV: RRR, no M/G/R   LUNGS: CTAB  EXTREMITY: no edema   NEURO: no gross deficits

## 2021-06-09 NOTE — TELEPHONE ENCOUNTER
Patient is aware. Having MRI completed at [facility], will fax order over to [facility] location.

## 2021-06-09 NOTE — TELEPHONE ENCOUNTER
I have the mammogram ordered, she can call to get this scheduled if she would like.     They are going to want to make sure she's not pregnant before they do the mammogram

## 2021-06-09 NOTE — TELEPHONE ENCOUNTER
Orders being requested: Bilateral mammogram  Reason service is needed/diagnosis: The patient is experiencing breast pain.  Is taking fertility medication, but there is a significant history of breat cancer in her family, so would like to have one scheduled  When are orders needed by: ASAP, as patient needs to also have a head MRI as she is taking the fertility meds.Please advise.  Where to send Orders:  [facility]  Okay to leave detailed message?  Yes

## 2021-06-11 NOTE — PROGRESS NOTES
PROGRESS NOTE       SUBJECTIVE:  Gwyn Quintanilla is a 26 y.o. female   Chief Complaint   Patient presents with     Follow Up     ultrasound on 7/10     US PELVIS WITH TRANSVAGINAL NON OB  7/7/2017 10:18 AM     INDICATION: Pelvic and perineal pain  TECHNIQUE: Transabdominal scans were performed. Endovaginal ultrasound was performed to better visualize the adnexa.  COMPARISON: None.      FINDINGS:  Uterus measures 7.2 x 3.6 x 3.2 cm. Normal uterus with no masses.     Endometrial thickness is 5 mm. Normal smooth endometrium.      Right ovary measures 5.3 x 2.5 x 2.3 cm. Normal right ovary. Normal arterial duplex.     Left ovary measures 4.5 x 2.5 x 2.1 cm. Normal left ovary. Normal arterial duplex.     No significant free fluid in the cul-de-sac.     IMPRESSION:   CONCLUSION:  1.  Normal ultrasound of the pelvis.    Gwyn is here today to review the results of her pelvic ultrasound.  She understands that it is perfectly normal and there were no abnormal findings.  In reviewing things her cycles have been irregular.  She cycled 46 days, 38 days, 28 days, and now it has been 53.  Her LMP is 5/22/2017. ( I did not understand this history when I saw her for physical a few weeks ago.  Her cycles previously have always been regular.)       Current Outpatient Prescriptions   Medication Sig Dispense Refill     artificial tears,hypromellose, (GENTEAL; SYSTANE) 0.3 % Gel Administer to both eyes.       calcium 500 mg Tab Take 500 mg by mouth.       prenatal #115-iron-folic acid 29 mg iron- 1 mg Chew Chew daily.       cholecalciferol, vitamin D3, (VITAMIN D3) 2,000 unit cap Take by mouth.       multivitamin (ONE A DAY) per tablet Take 1 tablet by mouth.       multivitamin (ONE A DAY) per tablet Take 1 tablet by mouth.       triamcinolone (KENALOG) 0.1 % lotion Apply topically.       No current facility-administered medications for this visit.        History   Smoking Status     Never Smoker   Smokeless Tobacco     Not on file        REVIEW OF SYSTEMS:  Patient denies fever, chills, dizziness, headache, visual change, cough, chest pain, shortness of breath, abdominal pain, edema.     OBJECTIVE:       Vitals:    07/13/17 1605   BP: 116/80   Pulse: 62     Weight: 167 lb 3.2 oz (75.8 kg)  Wt Readings from Last 3 Encounters:   07/13/17 167 lb 3.2 oz (75.8 kg)   06/01/17 166 lb (75.3 kg)   03/01/17 166 lb 14.4 oz (75.7 kg)     Body mass index is 24.34 kg/(m^2).        Physical Exam:  GENERAL APPEARANCE: A&A, NAD, well hydrated, well nourished  Otherwise not examined      ASSESSMENT/PLAN:     1. Amenorrhea  We talked about the causes of anovulatory bleeding.  This is most certainly was happening to her.  Usually it can be explained by a change in sleep cycle, stress levels, dietary changes, life in general.  As long as she has a period approximately every 3 months it is not harmful to her body.  The only complication, would be it makes conception more difficult if that is the plan.  At this point we will simply check a thyroid level and be sure that is okay and patient will keep track of her cycles.  If they extend out further we recommend beginning birth control pills to establish cycling or inducing a period every 3 months.  Patient voices understanding and will keep us posted.  - Thyroid Stimulating Hormone (TSH)        The visit lasted a total of 20 minutes face to face with the patient.  Over 50% of the time spent counseling and educating the patient about all of the above.      Elizabeth Baca MD

## 2021-06-11 NOTE — PROGRESS NOTES
Gwyn Quintanilla is a 26 y.o. female is here for a  Health Maintenance exam. Patient is overall doing well.  Feels well and has no concerns.  She had some severe pain on the left side so would like to have that checked.    Healthy Habits:   Regular Exercise: Yes  Sunscreen Use: Yes  Healthy Diet: Yes  Dental Visits Regularly: Yes  Seat Belt: Yes  Sexually active: Yes  Self Breast Exam Monthly:Yes  Hemoccults: No  Flex Sig: No  Colonoscopy: No  Lipid Profile: Yes  Glucose Screen: Yes  Prevention of Osteoporosis: Yes  Last Dexa: No  Guns at Home:  No  Domestic Violence:  No    Current Outpatient Medications Include:    Current Outpatient Prescriptions:      artificial tears,hypromellose, (GENTEAL; SYSTANE) 0.3 % Gel, Administer to both eyes., Disp: , Rfl:      calcium 500 mg Tab, Take 500 mg by mouth., Disp: , Rfl:      prenatal #115-iron-folic acid 29 mg iron- 1 mg Chew, Chew daily., Disp: , Rfl:      cholecalciferol, vitamin D3, (VITAMIN D3) 2,000 unit cap, Take by mouth., Disp: , Rfl:      multivitamin (ONE A DAY) per tablet, Take 1 tablet by mouth., Disp: , Rfl:      multivitamin (ONE A DAY) per tablet, Take 1 tablet by mouth., Disp: , Rfl:      triamcinolone (KENALOG) 0.1 % lotion, Apply topically., Disp: , Rfl:     Allergies:  No Known Allergies    Past Medical History:   Diagnosis Date     Abnormal Pap smear of cervix      Abnormal vaginal Pap smear      Anxiety      Astigmatism      Cervical dysplasia     CINDY I     Depression      Excessive sweating      H/O colposcopy with cervical biopsy      Keratosis pilaris      Myopia        Past Surgical History:   Procedure Laterality Date     LASIK  2017     TONSILLECTOMY  1994     TOOTH EXTRACTION         OB History    Para Term  AB Living   0 0 0 0 0 0   SAB TAB Ectopic Multiple Live Births   0 0 0 0 0             Immunization History   Administered Date(s) Administered     DTP 1988, 1988, 1989, 1990, 1993     HPV  Bivalent 2013, 2013, 01/10/2014     HPV Quadrivalent 2013, 2013     Hep A, historic 2007     Hep B, Peds, Historic 2003, 2003, 04/15/2004     HiB, historic 1990     Influenza [id number], 2009     Influenza, inj, historic 11/15/2010, 10/31/2011, 10/24/2012, 2013     MMR 1989, 2000     Meningococcal Conjugate 2006     OPV 1988, 1988, 1990, 1993     Td, historic 2000, 2008       Family History   Problem Relation Age of Onset     Breast cancer Mother 42     on hormones for premature menopause     Breast cancer Maternal Grandmother       in 70's from Hepatitis C      Crohn's disease Father      Pancreatitis Sister      Alcohol abuse Sister      Cataracts Maternal Grandfather       in 60's     COPD Maternal Grandfather      Diabetes Maternal Uncle        Social History     Social History     Marital status:      Spouse name: Sahil     Number of children: 0     Years of education: N/A     Occupational History     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     Social History Main Topics     Smoking status: Never Smoker     Smokeless tobacco: Not on file     Alcohol use Yes     Drug use: No     Sexual activity: Yes     Partners: Male     Birth control/ protection: None     Other Topics Concern     Not on file     Social History Narrative       Last cholesterol:   Lab Results   Component Value Date    CHOL 160 2017    CHOL 148 03/10/2016     Lab Results   Component Value Date    HDL 49 (L) 2017    HDL 47 (L) 03/10/2016     Lab Results   Component Value Date    LDLCALC 91 2017    LDLCALC 79 03/10/2016     Lab Results   Component Value Date    TRIG 102 2017    TRIG 111 03/10/2016     No components found for: CHOLHDL          Birth Control Method: no   High Risk/Behavior: None      LMP: Patient's last menstrual period was 2017.  Menstrual Regularity: Regular every 31 days  Flow:  "Normal      Review of Systems:   General:  Denies fever, chills, HA, fatigue, myalgias, weight change    Eyes: Denies vision changes   Ears/Nose/Throat: Denies nasal congestion, rhinorrhea, ear pain or discharge, sore throat, swollen glands  Cardiovascular: Denies CP, palpitations  Respiratory:  Denies SOB, cough  Gastrointestinal:  Denies changes in bowel habits, melena, rectal bleeding,  Genitourinary: Denies changes in urine habits/frequency/dysuria, hematuria   Musculoskeletal:  Denies  joint pain or swelling or erythema, edema  Skin: Denies rashes   Neurologic: Denies weakness, paresthesia  Psychiatric: Denies mood changes   Endocrine: Denies polyuria, polydipsia, polyphagia  Heme/Lymphatic: Denies problem with bleeding   Allergic/Immunologic: Denies problem     POSITIVES: Check moles, history of abnormal Pap smear in the past.  Otherwise 114 point review of systems is entirely negative.      PHYSICAL EXAM:    /78  Pulse 86  Temp 98.2  F (36.8  C)  Ht 5' 9.5\" (1.765 m)  Wt 166 lb (75.3 kg)  LMP 05/22/2017  Breastfeeding? No  BMI 24.16 kg/m2    Wt Readings from Last 3 Encounters:   06/01/17 166 lb (75.3 kg)   03/01/17 166 lb 14.4 oz (75.7 kg)   02/15/17 163 lb (73.9 kg)       Body mass index is 24.16 kg/(m^2).    Well developed, well nourished, no acute distress.  HEENT: normocephalic/atraumatic, PERRLA/EOMI, TMs: Gray, normal light reflex, no nasal discharge.  Oral mucosa: no erythema/exudate  Neck: No LAD/masses/thyromegaly/bruits  Lungs: clear bilaterally  Heart: regular rate and rhythm, no murmurs/gallops/rubs  Breasts: symmetric, no masses/skin changes, nipple discharge, or axillary LAD.  BSE reviewed.  Abdomen: Normal bowel sounds, soft, non-tender, non-distended, no masses, neg Espinoza's/McBurney's, no rebound/guarding  Genital: Normal external genitalia, no discharge, no lesions, cervix is non-friable, os is closed, no CMT, no adnexal tenderness or fullness.  Uterus is not enlarged, perineum " intact.  Thin prep done.   Rectal: internal exam is deferred.  External exam is normal.  Lymphatics: no supraclavicular/axillary/epitrochlear/inguinal LAD. No edema.  Neuro: A&O x 3, CN II-XII intact, strength 5/5, reflexes symmetric, sensory intact to light touch.  Psych: Behavior appropriate, engaging.  Thought processes congruent, non-tangential.  Musculoskeletal: no gross deformities.  Skin: no rashes or lesions.      Recent Results (from the past 240 hour(s))   HM2(CBC w/o Differential)   Result Value Ref Range    WBC 5.9 4.0 - 11.0 thou/uL    RBC 5.19 3.80 - 5.40 mill/uL    Hemoglobin 15.1 12.0 - 16.0 g/dL    Hematocrit 45.6 35.0 - 47.0 %    MCV 88 80 - 100 fL    MCH 29.1 27.0 - 34.0 pg    MCHC 33.0 32.0 - 36.0 g/dL    RDW 11.1 11.0 - 14.5 %    Platelets 208 140 - 440 thou/uL    MPV 8.7 7.0 - 10.0 fL   Basic Metabolic Panel   Result Value Ref Range    Sodium 140 136 - 145 mmol/L    Potassium 4.5 3.5 - 5.0 mmol/L    Chloride 103 98 - 107 mmol/L    CO2 25 22 - 31 mmol/L    Anion Gap, Calculation 12 5 - 18 mmol/L    Glucose 87 70 - 125 mg/dL    Calcium 10.0 8.5 - 10.5 mg/dL    BUN 11 8 - 22 mg/dL    Creatinine 0.73 0.60 - 1.10 mg/dL    GFR MDRD Af Amer >60 >60 mL/min/1.73m2    GFR MDRD Non Af Amer >60 >60 mL/min/1.73m2   Lipid Cascade   Result Value Ref Range    Cholesterol 160 <=199 mg/dL    Triglycerides 102 <=149 mg/dL    HDL Cholesterol 49 (L) >=50 mg/dL    LDL Calculated 91 <=129 mg/dL    Patient Fasting > 8hrs? Yes        ASSESSMENT/PLAN: 26 y.o. female physical exam and pap smear.          1. Routine general medical examination at a health care facility    - HM2(CBC w/o Differential)  - Basic Metabolic Panel  - Lipid Cascade    2. Pap smear for cervical cancer screening  Pelvic exam is normal and no etiology for left-sided pelvic discomfort noted.  If the pain recurs she will let me know.  - Gynecologic Cytology (PAP Smear)    There are no discontinued medications.    Routine health maintenance discussion:   No smoking, limited alcohol (7 or less servings per week), 5 fruits/veg servings per day, 200 minutes of exercise per week.  Daily calcium/vitamin D guidelines, bone health, yearly mammogram after age 39/regular pap smears/colon cancer screening beginning at age 50.  Accident avoidance, sun screen.      Will contact her with the results of the labs when available.    Elizabeth Baca M.D.

## 2021-06-15 NOTE — PROGRESS NOTES
Patient arrived with  Sahil from Dr. Yen's office with complaints of elevated blood pressures. UA obtained and sent. Patient placed on monitor.   Serial Blood pressures per Dr Yen  1225:154/90  1240:157/93  1255: 143/80  1310: 142/82.  Lab on unit and blood drawn. Will await those results.  's, moderate variability, 15x15 accels, no decels.  Patient denying contractions.   Viviana Hatfield RN  3/1/2021 1:19 PM      Dr. Yen returned call and TO received to discharge patient to home.  Viviana Hatfield RN  3/1/2021 1:43 PM    Discharge instructions reviewed with patient.Patient discharged to home undelivered accompanied by .  Viviana Hatfield RN  3/1/2021 1:48 PM

## 2021-06-16 PROBLEM — H20.9 IRITIS: Chronic | Status: ACTIVE | Noted: 2019-11-14

## 2021-06-16 PROBLEM — I67.83 POSTPARTUM PRE-ECLAMPSIA WITH POSTERIOR LEUKOENCEPHALOPATHY SYNDROME: Status: ACTIVE | Noted: 2021-04-24

## 2021-06-16 PROBLEM — Z34.90 PREGNANT: Status: ACTIVE | Noted: 2021-04-19

## 2021-06-16 PROBLEM — O13.9 GESTATIONAL HYPERTENSION: Status: ACTIVE | Noted: 2021-04-22

## 2021-06-16 NOTE — ANESTHESIA PREPROCEDURE EVALUATION
Anesthesia Evaluation      Patient summary reviewed     Airway   Comment: Feasible intubation   Pulmonary - negative ROS                          Cardiovascular - negative ROS   Neuro/Psych - negative ROS     Endo/Other    (+) pregnant     GI/Hepatic/Renal - negative ROS           Dental                         Anesthesia Plan  Planned anesthetic: epidural  Patient requests labor epidural.  Chart reviewed, including labs.  I reviewed the options and risks with the patient, including but not limited to: bleeding, infection, damage to tissues under the skin (nerves, muscles, blood vessels), hypotension, headache, and epidural failure.  The patient's questions were answered and the consent was signed. The patient agrees to elective epidural placement.  ASA 2     Anesthetic plan and risks discussed with: patient

## 2021-06-16 NOTE — ANESTHESIA POSTPROCEDURE EVALUATION
Patient: Gwyn Quintanilla  * No procedures listed *  Anesthesia type: epidural    Patient location: Labor and Delivery  Last vitals: No vitals data found for the desired time range.    Post vital signs: stable  Level of consciousness: awake and responds to simple questions  Post-anesthesia pain: pain controlled  Post-anesthesia nausea and vomiting: no  Pulmonary: unassisted, return to baseline  Cardiovascular: stable and blood pressure at baseline  Hydration: adequate  Anesthetic events: no    QCDR Measures:  ASA# 11 - Phoebe-op Cardiac Arrest: ASA11B - Patient did NOT experience unanticipated cardiac arrest  ASA# 12 - Phoebe-op Mortality Rate: ASA12B - Patient did NOT die  ASA# 13 - PACU Re-Intubation Rate: NA - No ETT / LMA used for case  ASA# 10 - Composite Anes Safety: ASA10A - No serious adverse event    Additional Notes:

## 2021-06-16 NOTE — ANESTHESIA PROCEDURE NOTES
Epidural Block    Patient location during procedure: OB  Time Called: 4/21/2021 3:45 PM  Reason for Block:labor epidural  Staffing:  Performing  Anesthesiologist: Sunshine Palacios MD  Preanesthetic Checklist  Completed: patient identified, risks, benefits, and alternatives discussed, timeout performed, consent obtained, at patient's request, airway assessed, oxygen available, suction available, emergency drugs available and hand hygiene performed  Procedure  Patient position: sitting  Prep: ChloraPrep  Patient monitoring: continuous pulse oximetry, heart rate and blood pressure  Approach: midline  Location: L3-L4  Injection technique: PAXTON saline  Number of Attempts:1  Needle  Needle type: Concurrent Incsameer   Needle gauge: 18 G     Catheter in Space: 6  Assessment  Sensory level:  No complications

## 2021-06-16 NOTE — ANESTHESIA PROCEDURE NOTES
Epidural Block    Patient location during procedure: OB  Time Called: 4/21/2021 3:45 PM  Reason for Block:labor epidural  Staffing:  Performing  Anesthesiologist: Sunshine Palacios MD  Preanesthetic Checklist  Completed: patient identified, risks, benefits, and alternatives discussed, timeout performed, consent obtained, at patient's request, airway assessed, oxygen available, suction available, emergency drugs available and hand hygiene performed  Procedure  Patient position: sitting  Prep: ChloraPrep  Patient monitoring: continuous pulse oximetry, heart rate and blood pressure  Approach: midline  Location: L3-L4  Injection technique: PAXTON saline  Number of Attempts:1  Needle  Needle type: Minderestsameer   Needle gauge: 18 G     Catheter in Space: 6  Assessment  Sensory level:  No complications

## 2021-06-16 NOTE — ANESTHESIA PREPROCEDURE EVALUATION
Anesthesia Evaluation      Patient summary reviewed   No history of anesthetic complications     Airway   Comment: feasible   Pulmonary - negative ROS                          Cardiovascular    Neuro/Psych - negative ROS     Endo/Other    (+) pregnant     GI/Hepatic/Renal - negative ROS           Dental                         Anesthesia Plan  Planned anesthetic: epidural  Patient requests labor epidural.  Chart reviewed, including labs.  I reviewed the options and risks with the patient, including but not limited to: bleeding, infection, damage to tissues under the skin (nerves, muscles, blood vessels), hypotension, headache, and epidural failure.  The patient's questions were answered and the consent was signed. The patient agrees to elective epidural placement.    ASA 2     Anesthetic plan and risks discussed with: patient

## 2021-06-16 NOTE — PROGRESS NOTES
PROGRESS NOTE       SUBJECTIVE:  Gwyn Quintanilla is a 27 y.o. female   Chief Complaint   Patient presents with     Vaginal Itching     IRRITATION, BURNING WITH URINATION, SX ON GOING SINCE LAST TUESDAY    This patient complains of vaginal itching and irritation since last Tuesday.  She took a sitz bath because of low back pain and thinks that this is what brought this on.  She has had yeast infections before.  She has no other symptoms.          Current Outpatient Prescriptions   Medication Sig Dispense Refill     artificial tears,hypromellose, (GENTEAL; SYSTANE) 0.3 % Gel Administer to both eyes.       prenatal #115-iron-folic acid 29 mg iron- 1 mg Chew Chew daily.       No current facility-administered medications for this visit.        History   Smoking Status     Never Smoker   Smokeless Tobacco     Never Used       REVIEW OF SYSTEMS:  Patient denies fever, chills, dizziness, headache, visual change, cough, chest pain, shortness of breath, abdominal pain, extremity pain or swelling.          OBJECTIVE:       Vitals:    03/01/18 1555   BP: 120/82   Pulse: 84     Weight: 174 lb (78.9 kg)  Wt Readings from Last 3 Encounters:   03/01/18 174 lb (78.9 kg)   07/13/17 167 lb 3.2 oz (75.8 kg)   06/01/17 166 lb (75.3 kg)     Body mass index is 25.33 kg/(m^2).        Physical Exam:    Pelvic exam the perineum is intact without rash.  On speculum exam there is a patchy white discharge on the cervix which is adherent and consistent with yeast.        ASSESSMENT/PLAN:     1. Burning with urination  UA appears negative  - Urinalysis Macroscopic  - Culture, Urine    2. Vaginitis  Wet prep is also negative.  But exam is consistent with yeast vaginitis.  Therefore we will treat with Diflucan as dosed below.  Patient agrees with the plan.  - Wet Prep, Vaginal  - fluconazole (DIFLUCAN) 150 MG tablet; Take 1 tablet (150 mg total) by mouth once for 1 dose.  Dispense: 1 tablet; Refill: 1      There are no Patient Instructions on  file for this visit.  Medications Discontinued During This Encounter   Medication Reason     triamcinolone (KENALOG) 0.1 % lotion      multivitamin (ONE A DAY) per tablet      multivitamin (ONE A DAY) per tablet      cholecalciferol, vitamin D3, (VITAMIN D3) 2,000 unit cap      calcium 500 mg Tab      Return if symptoms worsen or fail to improve.    The visit lasted a total of 20 minutes face to face with the patient.  Over 50% of the time spent counseling and educating the patient about all of the above.      Elizabeth Baca MD

## 2021-06-19 NOTE — PROGRESS NOTES
ASSESSMENT/PLAN:       1. Irregular periods  -The patient has had irregular periods now for a while, and changing today shift does not change this.  Given this, her acne as well as her mild increased facial hair I am going to look for signs of polycystic ovaries as well as any other obvious etiologies.  Additionally since she has been trying to get pregnant for a year I am going to start her on metformin and I ordered a hysterosalpingogram to be done on day 7-12 of menstrual cycle.  If things do not change to be more regular in the next 2-3 months, if she does not tolerate metformin or if everything else is unremarkable I will refer her to OB for further evaluation and assessment.  She is comfortable with this.  -We did discuss that we should consider getting a semen analysis done for her  as well.  - Follicle Stimulating Hormone (FSH)  - Luteinizing Hormone (LH)  - Thyroid Cascade  - Comprehensive Metabolic Panel  - HM2(CBC w/o Differential)  - Testosterone, Free and Total  - Dehydroepiandrosterone Sulfate, Serum (DHEAS)  - metFORMIN (GLUCOPHAGE XR) 500 MG 24 hr tablet; Take 500 mg orally daily for two weeks, then increase to 1000 mg orally daily  Dispense: 60 tablet; Refill: 2  - Beta-hCG, Qualitative, Serum  - XR Hysterosalpingogram; Future  - Follicle Stimulating Hormone (FSH); Future  - Anti-Mullerian Hormone; Future  - Estradiol; Future  - Prolactin; Future    25 minutes was spent face-to-face with the patient during this encounter and >50% of this was spent on counseling and coordination of care. We discussed in depth the topics listed above.       Peg Mahan MD      PROGRESS NOTE   7/17/2018    SUBJECTIVE:  Gwyn Quintanilla is a 27 y.o. female  who presents for lack of a period.     She doesn't think that she is ovulating. She has been using the clear blue test strips. Her last one was 6/4. She is on day 40. She used to be 35-37 days.Last summer she had nothing for 3-4 months. There  was a 50 day one in there as well. She did stop night shift in mid march. She did start getting pimples as well. Has been trying for more than one year.  She has some mild chin hair as well.    She does have yeast infections at times. She did have fluconazole. She did have a pain on the right side as well when she was bearing down. Before this she thought what she feeling was ovulation. Her periods aren't super heavy. No stress. Every 3 weeks she is off for a full week. Works 12 hours at a time. She is anxious some.   Chief Complaint   Patient presents with     Other     Pt states she believes she is not ovulating and would like to have this verified and examined today.          There is no problem list on file for this patient.      Current Outpatient Prescriptions   Medication Sig Dispense Refill     prenatal #115-iron-folic acid 29 mg iron- 1 mg Chew Chew daily.        artificial tears,hypromellose, (GENTEAL; SYSTANE) 0.3 % Gel Administer to both eyes.       metFORMIN (GLUCOPHAGE XR) 500 MG 24 hr tablet Take 500 mg orally daily for two weeks, then increase to 1000 mg orally daily 60 tablet 2     No current facility-administered medications for this visit.        History   Smoking Status     Never Smoker   Smokeless Tobacco     Never Used           OBJECTIVE:        Recent Results (from the past 240 hour(s))   HM2(CBC w/o Differential)   Result Value Ref Range    WBC 6.4 4.0 - 11.0 thou/uL    RBC 5.21 3.80 - 5.40 mill/uL    Hemoglobin 15.2 12.0 - 16.0 g/dL    Hematocrit 45.4 35.0 - 47.0 %    MCV 87 80 - 100 fL    MCH 29.1 27.0 - 34.0 pg    MCHC 33.4 32.0 - 36.0 g/dL    RDW 11.6 11.0 - 14.5 %    Platelets 222 140 - 440 thou/uL    MPV 8.8 7.0 - 10.0 fL       Vitals:    07/17/18 1142   BP: 130/70   Patient Site: Left Arm   Patient Position: Sitting   Cuff Size: Adult Regular   Pulse: 84   Weight: 177 lb 9.6 oz (80.6 kg)     Weight: 177 lb 9.6 oz (80.6 kg)        Physical Exam:  GENERAL APPEARANCE: A&A, NAD, well  hydrated, well nourished  SKIN:  Normal skin turgor, no lesions/rashes   HEENT: moist mucous membranes, no rhinorrhea  NECK: Normal  CV: RRR, no M/G/R   LUNGS: CTAB  Psych: She is mildly tearful at times, she is well-dressed and groomed, her thought process and speech pattern are normal  NEURO: no gross deficits

## 2021-06-19 NOTE — PROGRESS NOTES
Chief Complaint   Patient presents with     poss infected right thumb     Last night it was throbbing. Woke up this morning and noticed the thumb is red, swollen, and warm. Hurts to move.        HPI    Patient is here for mild to moderate throbbing pain from right thumb with redness and swelling started last night. She had a hangnail a week ago on the right thumb. No fever, chills.     ROS: Pertinent ROS noted in HPI.     No Known Allergies    There is no problem list on file for this patient.      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 No fluctuance nor purulence. Full ROM and strength of the joints of the affected finger.  CV: Normal cap refills of the right thumb  Neuro: intact gross sensation of R thumb.        Cellulitis of right thumb  -     cephalexin (KEFLEX) 500 MG capsule; Take 1 capsule (500 mg total) by mouth 4 (four) times a day for 10 days.

## 2021-06-20 ENCOUNTER — HEALTH MAINTENANCE LETTER (OUTPATIENT)
Age: 31
End: 2021-06-20

## 2021-06-20 NOTE — PROGRESS NOTES
ASSESSMENT/PLAN:       1. Swelling of eyelid, left  -I do suspect that she has a component of contact dermatitis on the eyelid.  Given the location of and have her trial some Protopic topically, and she will follow-up here if things are not improving or start worsening we can trial an antibiotic for possible secondary infection.  - tacrolimus (PROTOPIC) 0.03 % ointment; Apply small amount to affected 2 times daily until gone  Dispense: 30 g; Refill: 0    2. Acute conjunctivitis of left eye  -Additionally she does appear to have some conjunctivitis with possible blepharitis as well.  She is going to treat the area with the Polytrim drops as well as baby shampoo washes and follow-up here if things are not improving.  Continue on her Zyrtec as well and consider adding some Flonase.  - polymyxin B-trimethoprim (FOR POLYTRIM) 10,000 unit- 1 mg/mL Drop ophthalmic drops; Administer 1 drop to both eyes every 4 (four) hours.  Dispense: 10 mL; Refill: 0        Peg Mahan MD      PROGRESS NOTE   9/18/2018    SUBJECTIVE:  Gwyn Quintanilla is a 28 y.o. female  who presents for eye swelling.     She noted four days ago that her eye was swollen and it got worse through the weekend.  She would take Benadryl and this did seem to help with the swelling.  She did note some lip swelling 2 days ago and did present to urgent care who told her she had contact dermatitis and sent her home on a prednisone taper.  Things did seem to improve yesterday however today she woke up things are more swollen again.  The eyelid is itchy, there is some matter on the lower lid. She does have some congestion in her head still. She is sneezing as well.     She did see Liz[facility] Barcl[facility] at [facility], is taking progesterone and clomid.   Chief Complaint   Patient presents with     Follow-up     Patient was seen on 9/16/18 at [facility] in care for contact dermatitis. Patient states she was feeling better yesterday. This morning patient feels that  the swelling has came back and her left eye feels itchy.           There is no problem list on file for this patient.      Current Outpatient Prescriptions   Medication Sig Dispense Refill     artificial tears,hypromellose, (GENTEAL; SYSTANE) 0.3 % Gel Administer to both eyes.       cetirizine (ZYRTEC) 10 MG tablet Take 10 mg by mouth daily.       cholecalciferol, vitamin D3, (VITAMIN D3 ORAL) Take by mouth daily.       docosahexanoic acid/epa (FISH OIL ORAL) Take by mouth daily.       predniSONE (DELTASONE) 20 MG tablet Take 1 tablet (20 mg total) by mouth daily for 5 days. 5 tablet 0     prenatal #115-iron-folic acid 29 mg iron- 1 mg Chew Chew daily.        metFORMIN (GLUCOPHAGE XR) 500 MG 24 hr tablet Take 500 mg orally daily for two weeks, then increase to 1000 mg orally daily 60 tablet 2     polymyxin B-trimethoprim (FOR POLYTRIM) 10,000 unit- 1 mg/mL Drop ophthalmic drops Administer 1 drop to both eyes every 4 (four) hours. 10 mL 0     tacrolimus (PROTOPIC) 0.03 % ointment Apply small amount to affected 2 times daily until gone 30 g 0     No current facility-administered medications for this visit.        History   Smoking Status     Never Smoker   Smokeless Tobacco     Never Used           OBJECTIVE:        No results found for this or any previous visit (from the past 240 hour(s)).    Vitals:    09/18/18 1045   BP: 130/78   Patient Site: Left Arm   Patient Position: Sitting   Cuff Size: Adult Regular   Pulse: 84   Temp: 98.3  F (36.8  C)   TempSrc: Oral   SpO2: 99%   Weight: 168 lb 8 oz (76.4 kg)     Weight: 168 lb 8 oz (76.4 kg)        Physical Exam:  GENERAL APPEARANCE: A&A, NAD, well hydrated, well nourished  SKIN:  Normal skin turgor, slight area of redness on the upper lid on the left without any scaling  HEENT: moist mucous membranes, no rhinorrhea, tympanic membrane's are clear bilaterally, right eyelid is unremarkable with some slight conjunctival erythema, left eyelid is swollen over the top, she  has conjunctival edema and erythema present as well as some crusting on the lower lid margin  NECK: Normal, without lymphadenopathy  CV: RRR, no M/G/R   LUNGS: CTAB  EXTREMITY: no edema   NEURO: no gross deficits

## 2021-06-20 NOTE — PROGRESS NOTES
Assessment:     1. Contact dermatitis  predniSONE (DELTASONE) 20 MG tablet          Plan:     Differential diagnosis include but not limited to contact dermatitis, allergic reaction, swelling of the left side of the face.  Discussed with the patient that at this time it could be something she is coming in contact with not sure what is happening on the left side of the face.  Will start patient on prednisone 20 mg daily ×5 days.  Advised patient to continue with Benadryl as needed.  May also take Zyrtec or Claritin for symptom relief.  Monitor for worsening symptoms.  If she continues to experience the symptoms she need to follow-up with a PCP.  Patient verbalized understanding the plan of care    Subjective:       28 y.o. female presents for evaluation of left eye swelling.  Patient reports that she has been experiencing this for a few days.  Last week she had a head cold and she noticed that above her eyelid it was pink and a few hours later her upper eyelid started swelling.  She took Benadryl, at that time she noticed also the swelling was going down her cheek area.  On Saturday during the day she was okay but in the evening she noticed that the swelling was getting worse and she also took Benadryl.  This morning when she woke up she noticed that she had some more swelling in her cheek upper left lobe and her eye.  She denies any new makeup or any new products.  She denies coming in contact with anything that could make her have an allergic reaction, she is just worried because this is happening on one side of the face.  She denies any other symptoms including nausea, vomiting, shortness of breath, or coughing.    The following portions of the patient's history were reviewed and updated as appropriate: allergies, current medications, past family history, past medical history, past social history, past surgical history and problem list.    Review of Systems  A 12 point comprehensive review of systems was negative  except as noted.     Objective:      /80  Pulse 74  Temp 97.8  F (36.6  C) (Oral)   Resp 16  Wt 167 lb (75.8 kg)  SpO2 99%  BMI 24.31 kg/m2  General appearance: alert, appears stated age, cooperative and mild distress  Head: Normocephalic, without obvious abnormality, atraumatic, sinuses nontender to percussion  Eyes: positive findings: eyelids/periorbital: left upper euelid swelling extends to the left side of the face and upper lip.    Ears: abnormal TM right ear - bulging and air-fluid level and abnormal TM left ear - bulging and air-fluid level  Nose: Nares normal. Septum midline. Mucosa normal. No drainage or sinus tenderness., moderate congestion  Throat: lips, mucosa, and tongue normal; teeth and gums normal  Lungs: clear to auscultation bilaterally  Heart: regular rate and rhythm, S1, S2 normal, no murmur, click, rub or gallop  Extremities: extremities normal, atraumatic, no cyanosis or edema  Pulses: 2+ and symmetric  Skin: Swelling to the left side of the face.  Lymph nodes: Cervical, supraclavicular, and axillary nodes normal.  Neurologic: Grossly normal        This note has been dictated using voice recognition software. Any grammatical or context distortions are unintentional and inherent to the software

## 2021-10-11 ENCOUNTER — HEALTH MAINTENANCE LETTER (OUTPATIENT)
Age: 31
End: 2021-10-11

## 2021-12-05 ENCOUNTER — HEALTH MAINTENANCE LETTER (OUTPATIENT)
Age: 31
End: 2021-12-05

## 2021-12-28 ENCOUNTER — IMMUNIZATION (OUTPATIENT)
Dept: NURSING | Facility: CLINIC | Age: 31
End: 2021-12-28
Payer: COMMERCIAL

## 2021-12-28 PROCEDURE — 91306 COVID-19,PF,MODERNA (18+ YRS BOOSTER .25ML): CPT

## 2021-12-28 PROCEDURE — 0064A COVID-19,PF,MODERNA (18+ YRS BOOSTER .25ML): CPT

## 2022-02-25 ENCOUNTER — OFFICE VISIT (OUTPATIENT)
Dept: FAMILY MEDICINE | Facility: CLINIC | Age: 32
End: 2022-02-25
Payer: COMMERCIAL

## 2022-02-25 VITALS
WEIGHT: 188 LBS | DIASTOLIC BLOOD PRESSURE: 90 MMHG | OXYGEN SATURATION: 99 % | BODY MASS INDEX: 27.36 KG/M2 | SYSTOLIC BLOOD PRESSURE: 142 MMHG | HEART RATE: 99 BPM

## 2022-02-25 DIAGNOSIS — Z00.00 ENCOUNTER FOR ROUTINE HISTORY AND PHYSICAL EXAMINATION OF ADULT: Primary | ICD-10-CM

## 2022-02-25 PROCEDURE — 99385 PREV VISIT NEW AGE 18-39: CPT | Performed by: FAMILY MEDICINE

## 2022-02-25 RX ORDER — ASCORBIC ACID 500 MG
1000 TABLET ORAL
COMMUNITY
End: 2022-11-09

## 2022-02-25 RX ORDER — FOLIC ACID/MULTIVIT,IRON,MINER 0.4MG-18MG
TABLET ORAL
COMMUNITY

## 2022-02-25 RX ORDER — CHOLECALCIFEROL (VITAMIN D3) 50 MCG
TABLET ORAL
COMMUNITY

## 2022-02-25 NOTE — PROGRESS NOTES
SUBJECTIVE:   CC: Gwyn Quintanilla is an 31 year old woman who presents for preventive health visit.       Patient has been advised of split billing requirements and indicates understanding: Yes  Healthy Habits:     Getting at least 3 servings of Calcium per day:  Yes    Bi-annual eye exam:  Yes    Dental care twice a year:  Yes    Sleep apnea or symptoms of sleep apnea:  None    Diet:  Regular (no restrictions)    Frequency of exercise:  None    Taking medications regularly:  Yes    Medication side effects:  None    PHQ-2 Total Score: 0    Additional concerns today:  Yes    She comes in today for a physical. She has been doing well. She is about 8 weeks pregnant. She has an appointment with Dr. Yen on 3/7. Will defer pap today. She did have gestational hypertension with her pregnancy, did get readmitted after delivery and needed magnesium.     She has been having some abdominal pain. Somedays she was having more pain when she pressed down on her belly button, it is better now as well. She is still breastfeeding as well.     She does have a blood pressure cuff at home. It was totally normal prior to getting pregnant.     She does have a cyst on her back as well, left side, been there for about a year. She does note that it was painful initially, no longer now, not changing.       Today's PHQ-2 Score:   PHQ-2 ( 1999 Pfizer) 2/24/2022   Q1: Little interest or pleasure in doing things 0   Q2: Feeling down, depressed or hopeless 0   PHQ-2 Score 0   Q1: Little interest or pleasure in doing things Not at all   Q2: Feeling down, depressed or hopeless Not at all   PHQ-2 Score 0       Abuse: Current or Past (Physical, Sexual or Emotional) - No  Do you feel safe in your environment? Yes    Have you ever done Advance Care Planning? (For example, a Health Directive, POLST, or a discussion with a medical provider or your loved ones about your wishes): No, advance care planning information given to patient to review.   Patient declined advance care planning discussion at this time.    Social History     Tobacco Use     Smoking status: Never Smoker     Smokeless tobacco: Never Used   Substance Use Topics     Alcohol use: Not Currently       Alcohol Use 2/24/2022   Prescreen: >3 drinks/day or >7 drinks/week? No     Reviewed orders with patient.  Reviewed health maintenance and updated orders accordingly - Yes      Breast Cancer Screening:    FHS-7:   Breast CA Risk Assessment (FHS-7) 2/24/2022   Did any of your first-degree relatives have breast or ovarian cancer? Yes   Did any of your relatives have bilateral breast cancer? No   Did any man in your family have breast cancer? No   Did any woman in your family have breast and ovarian cancer? No   Did any woman in your family have breast cancer before age 50 y? Yes   Do you have 2 or more relatives with breast and/or ovarian cancer? No   Do you have 2 or more relatives with breast and/or bowel cancer? No       Patient under 40 years of age: Routine Mammogram Screening not recommended.   Pertinent mammograms are reviewed under the imaging tab.    History of abnormal Pap smear: NO - age 30-65 PAP every 5 years with negative HPV co-testing recommended  PAP / HPV 6/1/2017   PAP Negative for squamous intraepithelial lesion or malignancy  Electronically signed by Edyta Mar CT (ASCP) on 6/7/2017 at  2:57 PM       Reviewed and updated as needed this visit by clinical staff   Tobacco  Allergies  Meds  Problems  Med Hx  Surg Hx  Fam Hx  Soc   Hx        Reviewed and updated as needed this visit by Provider   Tobacco  Allergies  Meds  Problems  Med Hx  Surg Hx  Fam Hx             Review of Systems  Per above     OBJECTIVE:   BP (!) 142/90   Pulse 99   Wt 85.3 kg (188 lb)   SpO2 99%   BMI 27.36 kg/m    Physical Exam  GENERAL: healthy, alert and no distress  EYES: Eyes grossly normal to inspection, PERRL and conjunctivae and sclerae normal  HENT: ear canals and TM's  "normal, nose and mouth without ulcers or lesions  NECK: no adenopathy, no asymmetry, masses, or scars and thyroid normal to palpation  RESP: lungs clear to auscultation - no rales, rhonchi or wheezes  BREAST: deferred, going to OB for first OB  CV: regular rate and rhythm, normal S1 S2, no S3 or S4, no murmur, click or rub, no peripheral edema and peripheral pulses strong  ABDOMEN: soft, nontender, no hepatosplenomegaly, no masses and bowel sounds normal   (female): deferred  MS: no gross musculoskeletal defects noted, no edema  SKIN: no suspicious lesions or rashes  NEURO: Normal strength and tone, mentation intact and speech normal  PSYCH: mentation appears normal, affect normal/bright        ASSESSMENT/PLAN:   Gwyn was seen today for physical.    Diagnoses and all orders for this visit:    Encounter for routine history and physical examination of adult    Other orders  -     REVIEW OF HEALTH MAINTENANCE PROTOCOL ORDERS    Routine health maintenance discussion:  No smoking, limited alcohol (7 or less servings per week), 5 fruits/veg servings per day, 200 minutes of exercise per week.  Daily calcium/vitamin D guidelines, bone health, colon cancer screening beginning at age 50.  Accident avoidance, sun screen.  -Doing well at this time. She will f/u with OB for her first OB visit in a week or two. She will have pelvic exam and breast exam. Deferred labs today as she will see OB    Patient has been advised of split billing requirements and indicates understanding: Yes    COUNSELING:  Reviewed preventive health counseling, as reflected in patient instructions       Regular exercise       Healthy diet/nutrition       Vision screening       Contraception       Family planning       Advance Care Planning    Estimated body mass index is 27.36 kg/m  as calculated from the following:    Height as of 4/19/21: 1.765 m (5' 9.5\").    Weight as of this encounter: 85.3 kg (188 lb).        She reports that she has never smoked. " She has never used smokeless tobacco.      Counseling Resources:  ATP IV Guidelines  Pooled Cohorts Equation Calculator  Breast Cancer Risk Calculator  BRCA-Related Cancer Risk Assessment: FHS-7 Tool  FRAX Risk Assessment  ICSI Preventive Guidelines  Dietary Guidelines for Americans, 2010  USDA's MyPlate  ASA Prophylaxis  Lung CA Screening    Peg Mahan MD  [facility]

## 2022-02-28 PROBLEM — Z87.59 HISTORY OF GESTATIONAL HYPERTENSION: Status: ACTIVE | Noted: 2021-04-22

## 2022-03-02 ENCOUNTER — LAB (OUTPATIENT)
Dept: FAMILY MEDICINE | Facility: CLINIC | Age: 32
End: 2022-03-02
Attending: FAMILY MEDICINE
Payer: COMMERCIAL

## 2022-03-02 DIAGNOSIS — Z20.822 SUSPECTED 2019 NOVEL CORONAVIRUS INFECTION: ICD-10-CM

## 2022-03-02 PROCEDURE — U0005 INFEC AGEN DETEC AMPLI PROBE: HCPCS

## 2022-03-02 PROCEDURE — U0003 INFECTIOUS AGENT DETECTION BY NUCLEIC ACID (DNA OR RNA); SEVERE ACUTE RESPIRATORY SYNDROME CORONAVIRUS 2 (SARS-COV-2) (CORONAVIRUS DISEASE [COVID-19]), AMPLIFIED PROBE TECHNIQUE, MAKING USE OF HIGH THROUGHPUT TECHNOLOGIES AS DESCRIBED BY CMS-2020-01-R: HCPCS

## 2022-03-03 LAB — SARS-COV-2 RNA RESP QL NAA+PROBE: NEGATIVE

## 2022-03-08 ENCOUNTER — NURSE TRIAGE (OUTPATIENT)
Dept: NURSING | Facility: CLINIC | Age: 32
End: 2022-03-08
Payer: COMMERCIAL

## 2022-03-08 ENCOUNTER — OFFICE VISIT (OUTPATIENT)
Dept: FAMILY MEDICINE | Facility: CLINIC | Age: 32
End: 2022-03-08
Payer: COMMERCIAL

## 2022-03-08 VITALS
OXYGEN SATURATION: 97 % | DIASTOLIC BLOOD PRESSURE: 105 MMHG | HEART RATE: 108 BPM | WEIGHT: 185 LBS | RESPIRATION RATE: 16 BRPM | BODY MASS INDEX: 26.93 KG/M2 | SYSTOLIC BLOOD PRESSURE: 162 MMHG | TEMPERATURE: 98.4 F

## 2022-03-08 DIAGNOSIS — R05.9 COUGH: Primary | ICD-10-CM

## 2022-03-08 DIAGNOSIS — J01.10 ACUTE NON-RECURRENT FRONTAL SINUSITIS: ICD-10-CM

## 2022-03-08 LAB — DEPRECATED S PYO AG THROAT QL EIA: NEGATIVE

## 2022-03-08 PROCEDURE — 87651 STREP A DNA AMP PROBE: CPT | Performed by: PHYSICIAN ASSISTANT

## 2022-03-08 PROCEDURE — U0003 INFECTIOUS AGENT DETECTION BY NUCLEIC ACID (DNA OR RNA); SEVERE ACUTE RESPIRATORY SYNDROME CORONAVIRUS 2 (SARS-COV-2) (CORONAVIRUS DISEASE [COVID-19]), AMPLIFIED PROBE TECHNIQUE, MAKING USE OF HIGH THROUGHPUT TECHNOLOGIES AS DESCRIBED BY CMS-2020-01-R: HCPCS | Performed by: PHYSICIAN ASSISTANT

## 2022-03-08 PROCEDURE — 99214 OFFICE O/P EST MOD 30 MIN: CPT | Performed by: PHYSICIAN ASSISTANT

## 2022-03-08 PROCEDURE — U0005 INFEC AGEN DETEC AMPLI PROBE: HCPCS | Performed by: PHYSICIAN ASSISTANT

## 2022-03-08 RX ORDER — AMOXICILLIN 875 MG
875 TABLET ORAL 2 TIMES DAILY
Qty: 20 TABLET | Refills: 0 | Status: SHIPPED | OUTPATIENT
Start: 2022-03-08 | End: 2022-03-18

## 2022-03-08 NOTE — TELEPHONE ENCOUNTER
Patient calls with concerns regarding a cough she has had for 2 1/2 - 3 weeks. She reports that she was seen in clinic on 2/25, lungs sounded good at that time. Patient thought that cough was improving, but then it worsened again over the weekend. Cough is loose, patient sometimes coughs up some mild phlegm. She denies any difficulty breathing or fever. She does have chest pain when coughing, but does not have any pain otherwise. Patient was seeing a chiropractor for possibly coughing a rib out of place. Patient has some mild nasal congestion, mainly on the right side, that has started in the last few days. Patient is able to sleep at night, will have coughing fits at times. Patient was tested for covid-19 and it was negative. Patient is currently 8 weeks pregnant.    See within 3 days per protocol. Patient reports that that she will use the walk in clinic at this time. She denies further questions or concerns.    Nikky Jacinto RN  [facility]        Reason for Disposition    Cough has been present for > 3 weeks    Additional Information    Negative: Bluish (or gray) lips or face    Negative: Severe difficulty breathing (e.g., struggling for each breath, speaks in single words)    Negative: Rapid onset of cough and has hives    Negative: Coughing started suddenly after medicine, an allergic food or bee sting    Negative: Difficulty breathing after exposure to flames, smoke, or fumes    Negative: Sounds like a life-threatening emergency to the triager    Negative: Previous asthma attacks and this feels like asthma attack    Negative: Chest pain present when not coughing    Negative: Difficulty breathing    Negative: Passed out (i.e., fainted, collapsed and was not responding)    Negative: Patient sounds very sick or weak to the triager    Negative: Coughed up > 1 tablespoon (15 ml) blood (Exception: blood-tinged sputum)    Negative: Fever > 103 F (39.4 C)    Negative: Fever > 101 F (38.3 C) and  over 60 years of age    Negative: Fever > 100.0 F (37.8 C) and has diabetes mellitus or a weak immune system (e.g., HIV positive, cancer chemotherapy, organ transplant, splenectomy, chronic steroids)    Negative: Fever > 100.0 F (37.8 C) and bedridden (e.g., nursing home patient, stroke, chronic illness, recovering from surgery)    Negative: Increasing ankle swelling    Negative: Wheezing is present    Negative: SEVERE coughing spells (e.g., whooping sound after coughing, vomiting after coughing)    Negative: Fever present > 3 days (72 hours)    Negative: Coughing up ricardo-colored (reddish-brown) or blood-tinged sputum    Negative: Fever returns after gone for over 24 hours and symptoms worse or not improved    Negative: Using nasal washes and pain medicine > 24 hours and sinus pain persists    Negative: Known COPD or other severe lung disease (i.e., bronchiectasis, cystic fibrosis, lung surgery) and worsening symptoms (i.e., increased sputum purulence or amount, increased breathing difficulty)    Negative: Continuous (nonstop) coughing interferes with work or school and no improvement using cough treatment per Care Advice    Negative: Patient wants to be seen    Protocols used: COUGH-A-[place]  COVID 19 Nurse Triage Plan/Patient Instructions    Please be aware that novel coronavirus (COVID-19) may be circulating in the community. If you develop symptoms such as fever, cough, or SOB or if you have concerns about the presence of another infection including coronavirus (COVID-19), please contact your health care provider or visit [email or web address].     Disposition/Instructions    In-Person Visit with provider recommended. Reference Visit Selection Guide.    Thank you for taking steps to prevent the spread of this virus.  o Limit your contact with others.  o Wear a simple mask to cover your cough.  o Wash your hands well and often.    Resources    [facility]: About COVID-19:  [email or web address]    CDC: What to Do If You're Sick: www.cdc.gov/coronavirus/2019-ncov/about/steps-when-sick.html    CDC: Ending Home Isolation: www.cdc.gov/coronavirus/2019-ncov/hcp/disposition-in-home-patients.html     CDC: Caring for Someone: www.cdc.gov/coronavirus/2019-ncov/if-you-are-sick/care-for-someone.html     [facility]: Interim Guidance for Hospital Discharge to Home: [email or web address]/[email or web address]    [facility] clinical trials (COVID-19 research studies): [email or web address]-clinical-trials     Below are the COVID-19 hotlines at the [place] [facility] ([facility]). Interpreters are available.   o For health questions: Call 640-479-8663 or 1-982.146.9191 (7 a.m. to 7 p.m.)  o For questions about schools and childcare: Call 044-821-1049 or 1-650.527.9546 (7 a.m. to 7 p.m.)

## 2022-03-08 NOTE — PROGRESS NOTES
Assessment & Plan:      Problem List Items Addressed This Visit     None      Visit Diagnoses     Cough    -  Primary    Relevant Orders    XR Chest 2 Views (Completed)    Streptococcus A Rapid Screen w/Reflex to PCR - Clinic Collect (Completed)    Symptomatic; Yes; 3/7/2022 COVID-19 Virus (Coronavirus) by PCR Nose    Group A Streptococcus PCR Throat Swab    Acute non-recurrent frontal sinusitis        Relevant Medications    amoxicillin (AMOXIL) 875 MG tablet        Medical Decision Making  Patient currently 8 weeks pregnant presents with ongoing cough for 2 to 3 weeks.  Examination today appears reassuring with clear lung auscultation and good oxygen saturation.  Chest x-ray is negative for focal pneumonia.  Suspect patient likely suffering from a new viral upper respiratory infection that began yesterday versus a bacterial sinusitis.  Will treat patient with oral antibiotics, adjusted to be safe during pregnancy.  Otherwise, continue with honey, fluids, rest, humidifiers, and over-the-counter acetaminophen.  Discussed signs of worsening symptoms and when to follow-up with PCP if no symptom improvement.     Subjective:      Gwyn Quintanilla is a 31 year old female currently 8 weeks pregnant, here for evaluation of ongoing cough.  Onset of symptoms was 2 to 3 weeks ago.  Patient was noticing decent improvement until symptoms acutely worsen again in the last 24 hours.  Patient notes productive cough of yellow phlegm, sore throat, headache worse on the right side compared to left, and a hoarse voice.  Her daughter at home was recently treated for an ear infection.  Her  is currently being treated for a sinus infection.  Patient denies shortness of breath.  She did have subjective fevers last night.     The following portions of the patient's history were reviewed and updated as appropriate: allergies, current medications, and problem list.     Review of Systems  Pertinent items are noted in  HPI.    Allergies  No Known Allergies    Family History   Problem Relation Age of Onset     Breast Cancer Mother 42.00        on hormones for premature menopause     Breast Cancer Maternal Grandmother          in 70's from Hepatitis C      Crohn's Disease Father      Pancreatitis Sister      Alcoholism Sister      Cataracts Maternal Grandfather          in 60's     Chronic Obstructive Pulmonary Disease Maternal Grandfather      Diabetes Maternal Uncle        Social History     Tobacco Use     Smoking status: Never Smoker     Smokeless tobacco: Never Used   Substance Use Topics     Alcohol use: Not Currently        Objective:      BP (!) 162/105   Pulse 108   Temp 98.4  F (36.9  C) (Oral)   Resp 16   Wt 83.9 kg (185 lb)   LMP 2021 (Exact Date)   SpO2 97%   BMI 26.93 kg/m    General appearance - alert, well appearing, and in no distress and non-toxic  Ears - TMs intact with moderate mucoid fluid, no bulging or erythema  Nose - normal and patent, no erythema, discharge or polyps  Mouth - Posterior pharynx is moderately erythematous with mild tonsillar swelling bilaterally, no exudate, mucous membranes moist, lips and tongue normal  Neck - Moderate bilateral anterior cervical lymphadenopathy  Chest - clear to auscultation, no wheezes, rales or rhonchi, symmetric air entry  Heart - normal rate, regular rhythm, normal S1, S2, no murmurs, rubs, clicks or gallops     Lab & Imaging Results    Results for orders placed or performed in visit on 22 (from the past 24 hour(s))   Streptococcus A Rapid Screen w/Reflex to PCR - Clinic Collect    Specimen: Throat; Swab   Result Value Ref Range    Group A Strep antigen Negative Negative   XR Chest 2 Views    Narrative    EXAM: XR CHEST 2 VW  LOCATION: [facility]  DATE/TIME: 3/8/2022 4:42 PM    INDICATION: ongoing cough 2 3 weeks; rule out pneumonia  COMPARISON: None.      Impression    IMPRESSION: 7 mm opacity at the lateral right  lung base is likely related to overlapping ribs and vessels. The lungs are otherwise unremarkable. No focal pneumonic consolidation or pleural effusion. Normal heart size. If the patient's symptoms persist,   CT evaluation may be helpful to exclude a radiographically occult process.         I personally reviewed these results and discussed findings with the patient.    The use of Dragon/ODEC dictation services was used to construct the content of this note; any grammatical errors are non-intentional. Please contact the author directly if you are in need of any clarification.

## 2022-03-09 LAB
GROUP A STREP BY PCR: NOT DETECTED
SARS-COV-2 RNA RESP QL NAA+PROBE: NEGATIVE

## 2022-03-28 ENCOUNTER — OFFICE VISIT (OUTPATIENT)
Dept: FAMILY MEDICINE | Facility: CLINIC | Age: 32
End: 2022-03-28
Payer: COMMERCIAL

## 2022-03-28 VITALS
RESPIRATION RATE: 16 BRPM | TEMPERATURE: 97.8 F | SYSTOLIC BLOOD PRESSURE: 164 MMHG | HEART RATE: 105 BPM | OXYGEN SATURATION: 98 % | DIASTOLIC BLOOD PRESSURE: 92 MMHG | BODY MASS INDEX: 27.71 KG/M2 | WEIGHT: 190.4 LBS

## 2022-03-28 DIAGNOSIS — R07.0 THROAT PAIN: Primary | ICD-10-CM

## 2022-03-28 DIAGNOSIS — Z33.1 PREGNANT STATE, INCIDENTAL: ICD-10-CM

## 2022-03-28 LAB
DEPRECATED S PYO AG THROAT QL EIA: NEGATIVE
GROUP A STREP BY PCR: NOT DETECTED

## 2022-03-28 PROCEDURE — 87651 STREP A DNA AMP PROBE: CPT | Performed by: PHYSICIAN ASSISTANT

## 2022-03-28 PROCEDURE — 99214 OFFICE O/P EST MOD 30 MIN: CPT | Performed by: PHYSICIAN ASSISTANT

## 2022-03-28 RX ORDER — CETIRIZINE HYDROCHLORIDE 10 MG/1
10 TABLET ORAL DAILY
COMMUNITY
End: 2022-11-09

## 2022-03-28 ASSESSMENT — ENCOUNTER SYMPTOMS
SORE THROAT: 1
FEVER: 1
COUGH: 1
GASTROINTESTINAL NEGATIVE: 1

## 2022-03-28 NOTE — PATIENT INSTRUCTIONS
1.  I will call you with your rapid strep test results.  2.  Your ear exam is looking pretty normal with no signs of ear infection.  3.  Continue with oral Zyrtec.  Consider adding Flonase nasal spray if your strep test comes back negative.

## 2022-03-28 NOTE — PROGRESS NOTES
Patient presents with:  Ear Problem: started friday, mostly Rt ear, throat pain started friday evening, cough, temp 99.9 highest, 11wks pregnant      Clinical Decision Making: Patient experiencing right ear pain.  Ear exam is normal today.  RST is negative.  Patient has mild swelling of her peritonsillar tissue, but no asymmetry or mass-effect of the throat.  Recommend salt water gargles and Tylenol for pain relief.  Covid test offered, but declined.  Suspect eustachian tube dysfunction.  Recommend adding Flonase.  Patient is already taking oral antihistamine.  Due to pregnancy she cannot take oral Sudafed.      ICD-10-CM    1. Throat pain  R07.0 Streptococcus A Rapid Screen w/Reflex to PCR     Group A Streptococcus PCR Throat Swab   2. Pregnant state, incidental  Z33.1        Patient Instructions   1.  I will call you with your rapid strep test results.  2.  Your ear exam is looking pretty normal with no signs of ear infection.  3.  Continue with oral Zyrtec.  Consider adding Flonase nasal spray if your strep test comes back negative.      HPI:  Gwyn Quintanilla is a 31 year old female who presents today complaining of right ear pain and throat pain for the past 3 days.  Patient is 11 weeks pregnant.  She had low-grade temperature of 99.9.  Patient was seen on 3/8/2021.  At that time she was diagnosed with frontal sinusitis and treated with amoxicillin.  She feels like her nasal congestion and cough has improved since then.  Her cough is still mildly present.    History obtained from the patient.    Problem List:  2021-04: Postpartum pre-eclampsia with posterior leukoencephalopathy   syndrome  2021-04: History of gestational hypertension  2021-04: Single liveborn infant delivered vaginally  2021-04: Pregnant  2019-11: Iritis      Past Medical History:   Diagnosis Date     Abnormal Pap smear of cervix      Abnormal vaginal Pap smear      Anxiety      Astigmatism      Cervical dysplasia     CINDY I     Depression       Excessive sweating      Gestational hypertension 4/22/2021     H/O colposcopy with cervical biopsy      Hypertension      Keratosis pilaris      Myopia      Single liveborn infant delivered vaginally 4/22/2021       Social History     Tobacco Use     Smoking status: Never Smoker     Smokeless tobacco: Never Used   Substance Use Topics     Alcohol use: Not Currently       Review of Systems   Constitutional: Positive for fever.   HENT: Positive for congestion, ear pain and sore throat.    Respiratory: Positive for cough (Dry).    Gastrointestinal: Negative.        Vitals:    03/28/22 1215   BP: (!) 164/92   Pulse: 105   Resp: 16   Temp: 97.8  F (36.6  C)   TempSrc: Oral   SpO2: 98%   Weight: 86.4 kg (190 lb 6.4 oz)       Physical Exam  Vitals and nursing note reviewed.   Constitutional:       General: She is not in acute distress.     Appearance: She is not toxic-appearing or diaphoretic.   HENT:      Head: Normocephalic and atraumatic.      Right Ear: Tympanic membrane, ear canal and external ear normal.      Left Ear: Tympanic membrane, ear canal and external ear normal.      Mouth/Throat:      Mouth: Mucous membranes are moist.      Pharynx: Posterior oropharyngeal erythema present. No oropharyngeal exudate.   Eyes:      Conjunctiva/sclera: Conjunctivae normal.   Cardiovascular:      Rate and Rhythm: Normal rate and regular rhythm.      Heart sounds: No murmur heard.  Pulmonary:      Effort: Pulmonary effort is normal. No respiratory distress.      Breath sounds: No stridor. No wheezing, rhonchi or rales.   Lymphadenopathy:      Cervical: No cervical adenopathy.   Neurological:      Mental Status: She is alert.   Psychiatric:         Mood and Affect: Mood normal.         Behavior: Behavior normal.         Thought Content: Thought content normal.         Judgment: Judgment normal.         Results:  Results for orders placed or performed in visit on 03/28/22   Streptococcus A Rapid Screen w/Reflex to PCR     Status:  Normal    Specimen: Throat; Swab   Result Value Ref Range    Group A Strep antigen Negative Negative         At the end of the encounter, I discussed results, diagnosis, medications. Discussed red flags for immediate return to clinic/ER, as well as indications for follow up if no improvement. Patient understood and agreed to plan. Patient was stable for discharge.

## 2022-04-01 ENCOUNTER — OFFICE VISIT (OUTPATIENT)
Dept: FAMILY MEDICINE | Facility: CLINIC | Age: 32
End: 2022-04-01
Payer: COMMERCIAL

## 2022-04-01 VITALS
DIASTOLIC BLOOD PRESSURE: 88 MMHG | TEMPERATURE: 98.1 F | WEIGHT: 186.8 LBS | BODY MASS INDEX: 26.74 KG/M2 | HEART RATE: 92 BPM | OXYGEN SATURATION: 100 % | RESPIRATION RATE: 14 BRPM | HEIGHT: 70 IN | SYSTOLIC BLOOD PRESSURE: 138 MMHG

## 2022-04-01 DIAGNOSIS — Z3A.11 11 WEEKS GESTATION OF PREGNANCY: ICD-10-CM

## 2022-04-01 DIAGNOSIS — J32.9 CHRONIC SINUSITIS, UNSPECIFIED LOCATION: Primary | ICD-10-CM

## 2022-04-01 PROCEDURE — 99214 OFFICE O/P EST MOD 30 MIN: CPT | Performed by: FAMILY MEDICINE

## 2022-04-01 RX ORDER — CEFDINIR 300 MG/1
300 CAPSULE ORAL 2 TIMES DAILY
Qty: 28 CAPSULE | Refills: 0 | Status: SHIPPED | OUTPATIENT
Start: 2022-04-01 | End: 2022-04-15

## 2022-04-01 RX ORDER — CEFDINIR 300 MG/1
300 CAPSULE ORAL 2 TIMES DAILY
Qty: 28 CAPSULE | Refills: 0 | Status: CANCELLED | OUTPATIENT
Start: 2022-04-01 | End: 2022-04-15

## 2022-04-01 NOTE — ASSESSMENT & PLAN NOTE
Pharyngitis and right ear pain ongoing for 6 weeks.   On exam today the nasal turbinates are markedly inflamed and erthematous and right tonsil mildly inflamed, but otherwise normal exam.  Urgent care visit 3/8/22 and 3/28/22 strep neg x 2, covid neg, cxr nomal, amox somewhat helpful, but not as much as she would have expected. This past week she started also getting right ear pain.   She tried nasal spray and zyrtec and warm salt water rinses which urgent care doctor recommended 3/28/22.    Trial of omnicef.  But since she already had amoxicillin which didn't work, if she is not improving contingency plan of ent visit is recommended. She will message me if needing referral to ENT if not better in the next week.

## 2022-04-01 NOTE — PROGRESS NOTES
Assessment & Plan   Problem List Items Addressed This Visit        Respiratory    Chronic sinusitis, unspecified location - Primary     Pharyngitis and right ear pain ongoing for 6 weeks.   On exam today the nasal turbinates are markedly inflamed and erthematous and right tonsil mildly inflamed, but otherwise normal exam.  Urgent care visit 3/8/22 and 3/28/22 strep neg x 2, covid neg, cxr nomal, amox somewhat helpful, but not as much as she would have expected. This past week she started also getting right ear pain.   She tried nasal spray and zyrtec and warm salt water rinses which urgent care doctor recommended 3/28/22.    Trial of omnicef.  But since she already had amoxicillin which didn't work, if she is not improving contingency plan of ent visit is recommended. She will message me if needing referral to ENT if not better in the next week.          Relevant Medications    cefdinir (OMNICEF) 300 MG capsule       Other    Pregnant     11 weeks pregnant today.                  Patient Instructions   Return if symptoms worsen or fail to improve, for 5-7 days if not improved.       Return if symptoms worsen or fail to improve, for 5-7 days if not improved.    Marlene Bunch MD  [facility]      Chief Complaint   Patient presents with     Pharyngitis     went to Urgent care on 3/8/22 got COVID, strept negative, chest x-ray, was given Amoxicillin.  Monday 3/28/22 went back to urgent care, did another strept test, negative. Feels like swelling and red tonsil x1 week     Ear Problem     rt ear pain, intermediate sharp pain x1 week        Subjective   Gwyn is a 31 year old who is new to me today presents for the following health issues sick with sore throat and initially a cough since valentines day. Has been to urgent care twice and tried amoxicillin without successful eradication of symptoms but there was some improvement.        Objective    /88 (BP Location: Left arm, Patient  "Position: Sitting, Cuff Size: Adult Regular)   Pulse 92   Temp 98.1  F (36.7  C) (Oral)   Resp 14   Ht 1.765 m (5' 9.5\")   Wt 84.7 kg (186 lb 12.8 oz)   LMP 12/30/2021 (Exact Date)   SpO2 100%   BMI 27.19 kg/m    Body mass index is 27.19 kg/m .  Physical Exam  Constitutional:       Appearance: Normal appearance.   HENT:      Head: Normocephalic and atraumatic.      Right Ear: Tympanic membrane, ear canal and external ear normal.      Left Ear: Tympanic membrane, ear canal and external ear normal.      Nose: Congestion (left greater than right congestion and inflammation) and rhinorrhea present.      Mouth/Throat:      Mouth: Mucous membranes are moist.      Pharynx: Posterior oropharyngeal erythema present.   Eyes:      Extraocular Movements: Extraocular movements intact.      Conjunctiva/sclera: Conjunctivae normal.      Pupils: Pupils are equal, round, and reactive to light.   Cardiovascular:      Rate and Rhythm: Normal rate and regular rhythm.      Heart sounds: Normal heart sounds.   Pulmonary:      Effort: Pulmonary effort is normal.      Breath sounds: Normal breath sounds.   Abdominal:      General: Bowel sounds are normal.      Palpations: Abdomen is soft.      Tenderness: There is no abdominal tenderness.      Comments: Gravid 1st trimester   Musculoskeletal:         General: Normal range of motion.      Cervical back: Normal range of motion and neck supple.   Neurological:      General: No focal deficit present.      Mental Status: She is alert and oriented to person, place, and time.          Answers for HPI/ROS submitted by the patient on 4/1/2022  How many servings of fruits and vegetables do you eat daily?: 2-3  On average, how many sweetened beverages do you drink each day (Examples: soda, juice, sweet tea, etc.  Do NOT count diet or artificially sweetened beverages)?: 1  How many minutes a day do you exercise enough to make your heart beat faster?: 9 or less  How many days a week do you " exercise enough to make your heart beat faster?: 3 or less  How many days per week do you miss taking your medication?: 0  What is the reason for your visit today?: Ear pain, sore throat  When did your symptoms begin?: 1-2 weeks ago  What are your symptoms?: Right ear pain, sore throat. Increased redness back of right side of throat. Buldge or ball swollen where tonsil ball would be.  How would you describe these symptoms?: Moderate  Are your symptoms:: Worsening  Have you had these symptoms before?: No  Is there anything that makes you feel better?: Tylenol

## 2022-04-07 ENCOUNTER — MYC MEDICAL ADVICE (OUTPATIENT)
Dept: FAMILY MEDICINE | Facility: CLINIC | Age: 32
End: 2022-04-07
Payer: COMMERCIAL

## 2022-07-25 ENCOUNTER — LAB REQUISITION (OUTPATIENT)
Dept: LAB | Facility: CLINIC | Age: 32
End: 2022-07-25

## 2022-07-25 DIAGNOSIS — Z3A.28 28 WEEKS GESTATION OF PREGNANCY: ICD-10-CM

## 2022-07-25 PROCEDURE — 86592 SYPHILIS TEST NON-TREP QUAL: CPT | Performed by: OBSTETRICS & GYNECOLOGY

## 2022-07-27 LAB — RPR SER QL: NONREACTIVE

## 2022-09-22 ENCOUNTER — LAB REQUISITION (OUTPATIENT)
Dept: LAB | Facility: CLINIC | Age: 32
End: 2022-09-22

## 2022-09-22 DIAGNOSIS — Z3A.28 28 WEEKS GESTATION OF PREGNANCY: ICD-10-CM

## 2022-09-22 PROCEDURE — 87653 STREP B DNA AMP PROBE: CPT | Performed by: OBSTETRICS & GYNECOLOGY

## 2022-09-23 LAB — GP B STREP DNA SPEC QL NAA+PROBE: NEGATIVE

## 2022-09-25 ENCOUNTER — HEALTH MAINTENANCE LETTER (OUTPATIENT)
Age: 32
End: 2022-09-25

## 2022-10-08 ENCOUNTER — HOSPITAL ENCOUNTER (INPATIENT)
Facility: CLINIC | Age: 32
LOS: 2 days | Discharge: HOME | End: 2022-10-10
Attending: OBSTETRICS & GYNECOLOGY | Admitting: OBSTETRICS & GYNECOLOGY
Payer: COMMERCIAL

## 2022-10-08 PROBLEM — Z34.90 PREGNANCY: Status: ACTIVE | Noted: 2022-10-08

## 2022-10-08 LAB
ABO/RH(D): NORMAL
ALBUMIN MFR UR ELPH: <7 MG/DL
ALT SERPL W P-5'-P-CCNC: 13 U/L (ref 0–45)
ANTIBODY SCREEN: NEGATIVE
AST SERPL W P-5'-P-CCNC: 15 U/L (ref 0–40)
CREAT SERPL-MCNC: 0.6 MG/DL (ref 0.6–1.1)
CREAT UR-MCNC: 20 MG/DL
ERYTHROCYTE [DISTWIDTH] IN BLOOD BY AUTOMATED COUNT: 14.4 % (ref 10–15)
GFR SERPL CREATININE-BSD FRML MDRD: >90 ML/MIN/1.73M2
HCT VFR BLD AUTO: 43.1 % (ref 35–47)
HGB BLD-MCNC: 13.8 G/DL (ref 11.7–15.7)
HOLD SPECIMEN: NORMAL
MCH RBC QN AUTO: 28.3 PG (ref 26.5–33)
MCHC RBC AUTO-ENTMCNC: 32 G/DL (ref 31.5–36.5)
MCV RBC AUTO: 89 FL (ref 78–100)
PLATELET # BLD AUTO: 217 10E3/UL (ref 150–450)
PROT/CREAT 24H UR: NORMAL MG/G{CREAT}
RBC # BLD AUTO: 4.87 10E6/UL (ref 3.8–5.2)
SARS-COV-2 RNA RESP QL NAA+PROBE: NEGATIVE
SPECIMEN EXPIRATION DATE: NORMAL
T PALLIDUM AB SER QL: NONREACTIVE
WBC # BLD AUTO: 11.2 10E3/UL (ref 4–11)

## 2022-10-08 PROCEDURE — 0KQM0ZZ REPAIR PERINEUM MUSCLE, OPEN APPROACH: ICD-10-PCS | Performed by: OBSTETRICS & GYNECOLOGY

## 2022-10-08 PROCEDURE — 10907ZC DRAINAGE OF AMNIOTIC FLUID, THERAPEUTIC FROM PRODUCTS OF CONCEPTION, VIA NATURAL OR ARTIFICIAL OPENING: ICD-10-PCS | Performed by: OBSTETRICS & GYNECOLOGY

## 2022-10-08 PROCEDURE — 120N000001 HC R&B MED SURG/OB

## 2022-10-08 PROCEDURE — 250N000013 HC RX MED GY IP 250 OP 250 PS 637: Performed by: OBSTETRICS & GYNECOLOGY

## 2022-10-08 PROCEDURE — 36415 COLL VENOUS BLD VENIPUNCTURE: CPT | Performed by: OBSTETRICS & GYNECOLOGY

## 2022-10-08 PROCEDURE — C9803 HOPD COVID-19 SPEC COLLECT: HCPCS

## 2022-10-08 PROCEDURE — U0005 INFEC AGEN DETEC AMPLI PROBE: HCPCS | Performed by: OBSTETRICS & GYNECOLOGY

## 2022-10-08 PROCEDURE — 85018 HEMOGLOBIN: CPT | Performed by: OBSTETRICS & GYNECOLOGY

## 2022-10-08 PROCEDURE — 84460 ALANINE AMINO (ALT) (SGPT): CPT | Performed by: OBSTETRICS & GYNECOLOGY

## 2022-10-08 PROCEDURE — 84450 TRANSFERASE (AST) (SGOT): CPT | Performed by: OBSTETRICS & GYNECOLOGY

## 2022-10-08 PROCEDURE — 86901 BLOOD TYPING SEROLOGIC RH(D): CPT | Performed by: OBSTETRICS & GYNECOLOGY

## 2022-10-08 PROCEDURE — 84156 ASSAY OF PROTEIN URINE: CPT | Performed by: OBSTETRICS & GYNECOLOGY

## 2022-10-08 PROCEDURE — 86850 RBC ANTIBODY SCREEN: CPT | Performed by: OBSTETRICS & GYNECOLOGY

## 2022-10-08 PROCEDURE — 82565 ASSAY OF CREATININE: CPT | Performed by: OBSTETRICS & GYNECOLOGY

## 2022-10-08 PROCEDURE — 86780 TREPONEMA PALLIDUM: CPT | Performed by: OBSTETRICS & GYNECOLOGY

## 2022-10-08 RX ORDER — MISOPROSTOL 100 UG/1
25 TABLET ORAL
Status: DISCONTINUED | OUTPATIENT
Start: 2022-10-08 | End: 2022-10-09 | Stop reason: HOSPADM

## 2022-10-08 RX ORDER — METOCLOPRAMIDE 10 MG/1
10 TABLET ORAL EVERY 6 HOURS PRN
Status: DISCONTINUED | OUTPATIENT
Start: 2022-10-08 | End: 2022-10-08

## 2022-10-08 RX ORDER — ONDANSETRON 2 MG/ML
4 INJECTION INTRAMUSCULAR; INTRAVENOUS EVERY 6 HOURS PRN
Status: DISCONTINUED | OUTPATIENT
Start: 2022-10-08 | End: 2022-10-09 | Stop reason: HOSPADM

## 2022-10-08 RX ORDER — HYDROXYZINE HYDROCHLORIDE 25 MG/1
50 TABLET, FILM COATED ORAL
Status: DISCONTINUED | OUTPATIENT
Start: 2022-10-08 | End: 2022-10-09 | Stop reason: HOSPADM

## 2022-10-08 RX ORDER — MISOPROSTOL 200 UG/1
400 TABLET ORAL
Status: DISCONTINUED | OUTPATIENT
Start: 2022-10-08 | End: 2022-10-08

## 2022-10-08 RX ORDER — OXYTOCIN 10 [USP'U]/ML
10 INJECTION, SOLUTION INTRAMUSCULAR; INTRAVENOUS
Status: DISCONTINUED | OUTPATIENT
Start: 2022-10-08 | End: 2022-10-10 | Stop reason: HOSPADM

## 2022-10-08 RX ORDER — METOCLOPRAMIDE 10 MG/1
10 TABLET ORAL EVERY 6 HOURS PRN
Status: DISCONTINUED | OUTPATIENT
Start: 2022-10-08 | End: 2022-10-09 | Stop reason: HOSPADM

## 2022-10-08 RX ORDER — CARBOPROST TROMETHAMINE 250 UG/ML
250 INJECTION, SOLUTION INTRAMUSCULAR
Status: DISCONTINUED | OUTPATIENT
Start: 2022-10-08 | End: 2022-10-08

## 2022-10-08 RX ORDER — METOCLOPRAMIDE HYDROCHLORIDE 5 MG/ML
10 INJECTION INTRAMUSCULAR; INTRAVENOUS EVERY 6 HOURS PRN
Status: DISCONTINUED | OUTPATIENT
Start: 2022-10-08 | End: 2022-10-09 | Stop reason: HOSPADM

## 2022-10-08 RX ORDER — IBUPROFEN 800 MG/1
800 TABLET, FILM COATED ORAL
Status: DISCONTINUED | OUTPATIENT
Start: 2022-10-08 | End: 2022-10-10 | Stop reason: HOSPADM

## 2022-10-08 RX ORDER — METHYLERGONOVINE MALEATE 0.2 MG/ML
200 INJECTION INTRAVENOUS
Status: DISCONTINUED | OUTPATIENT
Start: 2022-10-08 | End: 2022-10-08

## 2022-10-08 RX ORDER — OXYTOCIN/0.9 % SODIUM CHLORIDE 30/500 ML
100-340 PLASTIC BAG, INJECTION (ML) INTRAVENOUS CONTINUOUS PRN
Status: DISCONTINUED | OUTPATIENT
Start: 2022-10-08 | End: 2022-10-08

## 2022-10-08 RX ORDER — ONDANSETRON 4 MG/1
4 TABLET, ORALLY DISINTEGRATING ORAL EVERY 6 HOURS PRN
Status: DISCONTINUED | OUTPATIENT
Start: 2022-10-08 | End: 2022-10-09 | Stop reason: HOSPADM

## 2022-10-08 RX ORDER — CITRIC ACID/SODIUM CITRATE 334-500MG
30 SOLUTION, ORAL ORAL
Status: DISCONTINUED | OUTPATIENT
Start: 2022-10-08 | End: 2022-10-09 | Stop reason: HOSPADM

## 2022-10-08 RX ORDER — NALOXONE HYDROCHLORIDE 0.4 MG/ML
0.2 INJECTION, SOLUTION INTRAMUSCULAR; INTRAVENOUS; SUBCUTANEOUS
Status: DISCONTINUED | OUTPATIENT
Start: 2022-10-08 | End: 2022-10-09 | Stop reason: HOSPADM

## 2022-10-08 RX ORDER — MISOPROSTOL 200 UG/1
400 TABLET ORAL
Status: DISCONTINUED | OUTPATIENT
Start: 2022-10-08 | End: 2022-10-09 | Stop reason: HOSPADM

## 2022-10-08 RX ORDER — MISOPROSTOL 200 UG/1
800 TABLET ORAL
Status: DISCONTINUED | OUTPATIENT
Start: 2022-10-08 | End: 2022-10-08

## 2022-10-08 RX ORDER — NALOXONE HYDROCHLORIDE 0.4 MG/ML
0.2 INJECTION, SOLUTION INTRAMUSCULAR; INTRAVENOUS; SUBCUTANEOUS
Status: DISCONTINUED | OUTPATIENT
Start: 2022-10-08 | End: 2022-10-08

## 2022-10-08 RX ORDER — NALOXONE HYDROCHLORIDE 0.4 MG/ML
0.4 INJECTION, SOLUTION INTRAMUSCULAR; INTRAVENOUS; SUBCUTANEOUS
Status: DISCONTINUED | OUTPATIENT
Start: 2022-10-08 | End: 2022-10-09 | Stop reason: HOSPADM

## 2022-10-08 RX ORDER — OXYTOCIN/0.9 % SODIUM CHLORIDE 30/500 ML
340 PLASTIC BAG, INJECTION (ML) INTRAVENOUS CONTINUOUS PRN
Status: DISCONTINUED | OUTPATIENT
Start: 2022-10-08 | End: 2022-10-09 | Stop reason: HOSPADM

## 2022-10-08 RX ORDER — IBUPROFEN 800 MG/1
800 TABLET, FILM COATED ORAL
Status: DISCONTINUED | OUTPATIENT
Start: 2022-10-08 | End: 2022-10-08

## 2022-10-08 RX ORDER — METOCLOPRAMIDE HYDROCHLORIDE 5 MG/ML
10 INJECTION INTRAMUSCULAR; INTRAVENOUS EVERY 6 HOURS PRN
Status: DISCONTINUED | OUTPATIENT
Start: 2022-10-08 | End: 2022-10-08

## 2022-10-08 RX ORDER — TERBUTALINE SULFATE 1 MG/ML
0.25 INJECTION, SOLUTION SUBCUTANEOUS
Status: DISCONTINUED | OUTPATIENT
Start: 2022-10-08 | End: 2022-10-09 | Stop reason: HOSPADM

## 2022-10-08 RX ORDER — MISOPROSTOL 200 UG/1
800 TABLET ORAL
Status: DISCONTINUED | OUTPATIENT
Start: 2022-10-08 | End: 2022-10-09 | Stop reason: HOSPADM

## 2022-10-08 RX ORDER — NALOXONE HYDROCHLORIDE 0.4 MG/ML
0.4 INJECTION, SOLUTION INTRAMUSCULAR; INTRAVENOUS; SUBCUTANEOUS
Status: DISCONTINUED | OUTPATIENT
Start: 2022-10-08 | End: 2022-10-08

## 2022-10-08 RX ORDER — PROCHLORPERAZINE MALEATE 10 MG
10 TABLET ORAL EVERY 6 HOURS PRN
Status: DISCONTINUED | OUTPATIENT
Start: 2022-10-08 | End: 2022-10-08

## 2022-10-08 RX ORDER — KETOROLAC TROMETHAMINE 30 MG/ML
30 INJECTION, SOLUTION INTRAMUSCULAR; INTRAVENOUS
Status: DISCONTINUED | OUTPATIENT
Start: 2022-10-08 | End: 2022-10-08

## 2022-10-08 RX ORDER — ONDANSETRON 2 MG/ML
4 INJECTION INTRAMUSCULAR; INTRAVENOUS EVERY 6 HOURS PRN
Status: DISCONTINUED | OUTPATIENT
Start: 2022-10-08 | End: 2022-10-08

## 2022-10-08 RX ORDER — PROCHLORPERAZINE MALEATE 10 MG
10 TABLET ORAL EVERY 6 HOURS PRN
Status: DISCONTINUED | OUTPATIENT
Start: 2022-10-08 | End: 2022-10-09 | Stop reason: HOSPADM

## 2022-10-08 RX ORDER — PROCHLORPERAZINE 25 MG
25 SUPPOSITORY, RECTAL RECTAL EVERY 12 HOURS PRN
Status: DISCONTINUED | OUTPATIENT
Start: 2022-10-08 | End: 2022-10-09 | Stop reason: HOSPADM

## 2022-10-08 RX ORDER — ONDANSETRON 4 MG/1
4 TABLET, ORALLY DISINTEGRATING ORAL EVERY 6 HOURS PRN
Status: DISCONTINUED | OUTPATIENT
Start: 2022-10-08 | End: 2022-10-08

## 2022-10-08 RX ORDER — OXYTOCIN 10 [USP'U]/ML
10 INJECTION, SOLUTION INTRAMUSCULAR; INTRAVENOUS
Status: DISCONTINUED | OUTPATIENT
Start: 2022-10-08 | End: 2022-10-09 | Stop reason: HOSPADM

## 2022-10-08 RX ORDER — METHYLERGONOVINE MALEATE 0.2 MG/ML
200 INJECTION INTRAVENOUS
Status: DISCONTINUED | OUTPATIENT
Start: 2022-10-08 | End: 2022-10-09 | Stop reason: HOSPADM

## 2022-10-08 RX ORDER — KETOROLAC TROMETHAMINE 30 MG/ML
30 INJECTION, SOLUTION INTRAMUSCULAR; INTRAVENOUS
Status: DISCONTINUED | OUTPATIENT
Start: 2022-10-08 | End: 2022-10-10 | Stop reason: HOSPADM

## 2022-10-08 RX ORDER — PROCHLORPERAZINE 25 MG
25 SUPPOSITORY, RECTAL RECTAL EVERY 12 HOURS PRN
Status: DISCONTINUED | OUTPATIENT
Start: 2022-10-08 | End: 2022-10-08

## 2022-10-08 RX ORDER — OXYTOCIN/0.9 % SODIUM CHLORIDE 30/500 ML
100-340 PLASTIC BAG, INJECTION (ML) INTRAVENOUS CONTINUOUS PRN
Status: DISCONTINUED | OUTPATIENT
Start: 2022-10-08 | End: 2022-10-10 | Stop reason: HOSPADM

## 2022-10-08 RX ORDER — CARBOPROST TROMETHAMINE 250 UG/ML
250 INJECTION, SOLUTION INTRAMUSCULAR
Status: DISCONTINUED | OUTPATIENT
Start: 2022-10-08 | End: 2022-10-09 | Stop reason: HOSPADM

## 2022-10-08 RX ORDER — MORPHINE SULFATE 10 MG/ML
10 INJECTION, SOLUTION INTRAMUSCULAR; INTRAVENOUS
Status: DISCONTINUED | OUTPATIENT
Start: 2022-10-08 | End: 2022-10-09 | Stop reason: HOSPADM

## 2022-10-08 RX ORDER — CITRIC ACID/SODIUM CITRATE 334-500MG
30 SOLUTION, ORAL ORAL
Status: DISCONTINUED | OUTPATIENT
Start: 2022-10-08 | End: 2022-10-08

## 2022-10-08 RX ORDER — OXYTOCIN/0.9 % SODIUM CHLORIDE 30/500 ML
340 PLASTIC BAG, INJECTION (ML) INTRAVENOUS CONTINUOUS PRN
Status: DISCONTINUED | OUTPATIENT
Start: 2022-10-08 | End: 2022-10-08

## 2022-10-08 RX ADMIN — MISOPROSTOL 25 MCG: 100 TABLET ORAL at 15:32

## 2022-10-08 RX ADMIN — MISOPROSTOL 25 MCG: 100 TABLET ORAL at 09:27

## 2022-10-08 RX ADMIN — MISOPROSTOL 25 MCG: 100 TABLET ORAL at 17:33

## 2022-10-08 RX ADMIN — MISOPROSTOL 25 MCG: 100 TABLET ORAL at 11:32

## 2022-10-08 RX ADMIN — MISOPROSTOL 25 MCG: 100 TABLET ORAL at 13:27

## 2022-10-08 ASSESSMENT — ACTIVITIES OF DAILY LIVING (ADL)
TOILETING_ISSUES: NO
ADLS_ACUITY_SCORE: 18
WALKING_OR_CLIMBING_STAIRS_DIFFICULTY: NO
ADLS_ACUITY_SCORE: 18
ADLS_ACUITY_SCORE: 18
DOING_ERRANDS_INDEPENDENTLY_DIFFICULTY: NO
CHANGE_IN_FUNCTIONAL_STATUS_SINCE_ONSET_OF_CURRENT_ILLNESS/INJURY: NO
DIFFICULTY_EATING/SWALLOWING: NO
ADLS_ACUITY_SCORE: 18
DRESSING/BATHING_DIFFICULTY: NO
ADLS_ACUITY_SCORE: 18
CONCENTRATING,_REMEMBERING_OR_MAKING_DECISIONS_DIFFICULTY: NO
ADLS_ACUITY_SCORE: 18
WEAR_GLASSES_OR_BLIND: NO
FALL_HISTORY_WITHIN_LAST_SIX_MONTHS: NO
ADLS_ACUITY_SCORE: 18
ADLS_ACUITY_SCORE: 18

## 2022-10-08 NOTE — PROVIDER NOTIFICATION
Updated Bishop Erika, and BP of 163/93 at 0803 and 148/89 at 0819. Plan is to start Oral Cytotec and continue to watch pressures. RN ordered PE Labs.     Lacy Ayala RN

## 2022-10-08 NOTE — PROVIDER NOTIFICATION
Updated Dr. De Santiago of Blood Pressure of 160/91 at 1735 and Blood Pressure of 137/86 at 1751. Patient denies any symptoms. Continue to monitor at regular scheduled BP checks.     Lacy Ayala RN

## 2022-10-09 PROCEDURE — 250N000009 HC RX 250: Performed by: OBSTETRICS & GYNECOLOGY

## 2022-10-09 PROCEDURE — 250N000013 HC RX MED GY IP 250 OP 250 PS 637: Performed by: OBSTETRICS & GYNECOLOGY

## 2022-10-09 PROCEDURE — 120N000001 HC R&B MED SURG/OB

## 2022-10-09 PROCEDURE — 250N000011 HC RX IP 250 OP 636: Performed by: OBSTETRICS & GYNECOLOGY

## 2022-10-09 PROCEDURE — 722N000001 HC LABOR CARE VAGINAL DELIVERY SINGLE

## 2022-10-09 RX ORDER — IBUPROFEN 800 MG/1
800 TABLET, FILM COATED ORAL EVERY 6 HOURS PRN
Status: DISCONTINUED | OUTPATIENT
Start: 2022-10-09 | End: 2022-10-10 | Stop reason: HOSPADM

## 2022-10-09 RX ORDER — ACETAMINOPHEN 325 MG/1
650 TABLET ORAL EVERY 4 HOURS PRN
Status: DISCONTINUED | OUTPATIENT
Start: 2022-10-09 | End: 2022-10-10 | Stop reason: HOSPADM

## 2022-10-09 RX ORDER — CARBOPROST TROMETHAMINE 250 UG/ML
250 INJECTION, SOLUTION INTRAMUSCULAR
Status: DISCONTINUED | OUTPATIENT
Start: 2022-10-09 | End: 2022-10-10 | Stop reason: HOSPADM

## 2022-10-09 RX ORDER — METHYLERGONOVINE MALEATE 0.2 MG/ML
200 INJECTION INTRAVENOUS
Status: DISCONTINUED | OUTPATIENT
Start: 2022-10-09 | End: 2022-10-10 | Stop reason: HOSPADM

## 2022-10-09 RX ORDER — MISOPROSTOL 200 UG/1
800 TABLET ORAL
Status: DISCONTINUED | OUTPATIENT
Start: 2022-10-09 | End: 2022-10-10 | Stop reason: HOSPADM

## 2022-10-09 RX ORDER — MISOPROSTOL 200 UG/1
400 TABLET ORAL
Status: DISCONTINUED | OUTPATIENT
Start: 2022-10-09 | End: 2022-10-10 | Stop reason: HOSPADM

## 2022-10-09 RX ORDER — LIDOCAINE HYDROCHLORIDE 10 MG/ML
INJECTION, SOLUTION EPIDURAL; INFILTRATION; INTRACAUDAL; PERINEURAL
Status: DISCONTINUED
Start: 2022-10-09 | End: 2022-10-09 | Stop reason: WASHOUT

## 2022-10-09 RX ORDER — OXYTOCIN/0.9 % SODIUM CHLORIDE 30/500 ML
340 PLASTIC BAG, INJECTION (ML) INTRAVENOUS CONTINUOUS PRN
Status: COMPLETED | OUTPATIENT
Start: 2022-10-09 | End: 2022-10-09

## 2022-10-09 RX ORDER — MODIFIED LANOLIN
OINTMENT (GRAM) TOPICAL
Status: DISCONTINUED | OUTPATIENT
Start: 2022-10-09 | End: 2022-10-10 | Stop reason: HOSPADM

## 2022-10-09 RX ORDER — BISACODYL 10 MG
10 SUPPOSITORY, RECTAL RECTAL DAILY PRN
Status: DISCONTINUED | OUTPATIENT
Start: 2022-10-09 | End: 2022-10-10 | Stop reason: HOSPADM

## 2022-10-09 RX ORDER — HYDROCORTISONE 25 MG/G
CREAM TOPICAL 3 TIMES DAILY PRN
Status: DISCONTINUED | OUTPATIENT
Start: 2022-10-09 | End: 2022-10-10 | Stop reason: HOSPADM

## 2022-10-09 RX ORDER — DOCUSATE SODIUM 100 MG/1
100 CAPSULE, LIQUID FILLED ORAL DAILY
Status: DISCONTINUED | OUTPATIENT
Start: 2022-10-09 | End: 2022-10-10 | Stop reason: HOSPADM

## 2022-10-09 RX ORDER — OXYTOCIN 10 [USP'U]/ML
10 INJECTION, SOLUTION INTRAMUSCULAR; INTRAVENOUS
Status: COMPLETED | OUTPATIENT
Start: 2022-10-09 | End: 2022-10-09

## 2022-10-09 RX ADMIN — Medication 340 ML/HR: at 05:56

## 2022-10-09 RX ADMIN — ACETAMINOPHEN 650 MG: 325 TABLET, FILM COATED ORAL at 07:40

## 2022-10-09 RX ADMIN — IBUPROFEN 800 MG: 800 TABLET ORAL at 22:58

## 2022-10-09 RX ADMIN — IBUPROFEN 800 MG: 800 TABLET ORAL at 09:48

## 2022-10-09 RX ADMIN — KETOROLAC TROMETHAMINE 30 MG: 30 INJECTION, SOLUTION INTRAMUSCULAR at 04:00

## 2022-10-09 RX ADMIN — OXYTOCIN 10 UNITS: 10 INJECTION, SOLUTION INTRAMUSCULAR; INTRAVENOUS at 03:30

## 2022-10-09 RX ADMIN — ACETAMINOPHEN 650 MG: 325 TABLET, FILM COATED ORAL at 21:00

## 2022-10-09 RX ADMIN — DOCUSATE SODIUM 100 MG: 100 CAPSULE, LIQUID FILLED ORAL at 09:48

## 2022-10-09 RX ADMIN — IBUPROFEN 800 MG: 800 TABLET ORAL at 17:04

## 2022-10-09 ASSESSMENT — ACTIVITIES OF DAILY LIVING (ADL)
ADLS_ACUITY_SCORE: 18

## 2022-10-09 NOTE — PROVIDER NOTIFICATION
Dr. Yen updated of patient AROM via Dr. De Santiago at 1938, and SVE of 4 cm. MD aware patient off EFM and ambulating in hallway. Patient sanjuanita every 4 -5 minutes. Dr. Yen aware of patient blood pressure of 142/78, will continue to monitor. Per MD to recheck patient at 2245 and create plan for overnight.

## 2022-10-09 NOTE — PLAN OF CARE
"  Problem: Plan of Care - These are the overarching goals to be used throughout the patient stay.    Goal: Patient-Specific Goal (Individualized)  Description: You can add care plan individualizations to a care plan. Examples of Individualization might be:  \"Parent requests to be called daily at 9am for status\", \"I have a hard time hearing out of my right ear\", or \"Do not touch me to wake me up as it startles me\".  Outcome: Progressing  Goal: Optimal Comfort and Wellbeing  Outcome: Progressing  Intervention: Provide Person-Centered Care  Recent Flowsheet Documentation  Taken 10/9/2022 1300 by Lacy Ayala, RN  Trust Relationship/Rapport:   care explained   choices provided   emotional support provided   empathic listening provided   questions answered   questions encouraged   reassurance provided   thoughts/feelings acknowledged       Patients vitals are stable. Patient able to void. Ambulate to the bathroom and in her room independently. Will continue to monitor.     Lacy Ayala RN    "

## 2022-10-09 NOTE — L&D DELIVERY NOTE
OB Vaginal Delivery Note    Gwyn Quintanilla MRN# [id number]   Age: 32 year old YOB: 1990       GA: 38w6d  GP:   Labor Complications: None   EBL:   mL  Delivery QBL: 100 mL  Delivery Type: Vaginal, Spontaneous   ROM to Delivery Time: (Delivered) Hours: 7 Minutes: 44   Weight: 3.742 kg (8 lb 4 oz)    1 Minute 5 Minute 10 Minute   Apgar Totals: 9   9        VIRGIE CLARK;PAUL MCLEAN;BRENNEN FRANCO     Delivery Details:  Gwyn Quintanilla, a 32 year old  female delivered a viable infant with apgars of 9  and 9 . Patient was fully dilated and pushing after   hours   minutes in active labor. Delivery was via vaginal, spontaneous  to a sterile field under none  anesthesia. Infant delivered in vertex  left  occiput    position. Anterior and posterior shoulders delivered without difficulty. The cord was clamped, cut twice and 3 vessels  were noted. Cord blood was obtained in routine fashion with the following disposition: discard .      Cord complications: none   Placenta delivered at  . Placental disposition was Hospital disposal . Fundal massage performed and fundus found to be firm.     Episiotomy: none    Perineum, vagina, cervix were inspected, and the following lacerations were noted:   Perineal lacerations: 2nd                Any lacerations were repaired in the usual fashion using 3-0 vicryl.    Excellent hemostasis was noted. Needle count correct. Infant and patient in delivery room in good and stable condition.        Elio Quintanilla-Gwyn [[id number]]    Labor Event Times    Labor onset date: 10/9/22 Onset time:  1:30 AM   Dilation complete date: 10/9/22 Complete time:  3:20 AM   Start pushing date/time: 10/9/2022 0320      Labor Events     labor?: No   steroids: None  Labor Type: Induction/Cervical ripening  Predominate monitoring during 1st stage: continuous electronic fetal monitoring     Antibiotics received during labor?: No     Rupture date/time:  "10/8/22 1938   Rupture type: Artificial Rupture of Membranes  Fluid color: Clear     Induction: Misoprostol  Induction date/time: 10/8/22 0700   Cervical ripening date/time: 10/8/22 0700   Indications for induction: Hypertension     Augmentation: None  Indications for augmentation: Hypertension  1:1 continuous labor support provided by?: RN, provider Labor partogram used?: no      Delivery/Placenta Date and Time    Delivery Date: 10/9/22 Delivery Time:  3:22 AM   Oxytocin given at the time of delivery: after delivery of placenta  Delivering clinician: Donita De Santiago MD   Other personnel present at delivery:  Provider Role   Donita De Santiago MD Obstetrician   Paul Hagan RN Registered Nurse   Marli Anderson RN Registered Nurse         Vaginal Counts     Initial count performed by 2 team members:  Two Team Members   Dr. Jonnathan Hagan        Needles Suture Needles Sponges (RETIRED) Instruments   Initial counts 0 0 0    Added to count 1 1 0    Relief counts       Final counts 0 0 0          Placed during labor Accounted for at the end of labor   FSE No NA   IUPC No NA   Cervidil No NA              Final count performed by 2 team members:  Two Team Members   Dr. Jonnathan Hagan RN      Final count correct?: Yes     Apgars    Living status: Living   1 Minute 5 Minute 10 Minute 15 Minute 20 Minute   Skin color: 1  1       Heart rate: 2  2       Reflex irritability: 2  2       Muscle tone: 2  2       Respiratory effort: 2  2       Total: 9  9       Apgars assigned by: PAUL HAGAN RN     Cord    Vessels: 3 Vessels    Cord Complications: None               Cord Blood Disposition: Discard    Gases Sent?: No    Delayed cord clamping?: Yes    Cord Clamping Delay (seconds): 31-60 seconds    Stem cell collection?: No       [place] Resuscitation    Methods: None     [place] Measurements    Weight: 8 lb 4 oz Length: 1' 8.25\"   Head circumference: 33.7 cm       Skin to Skin and Feeding Plan    Skin to skin " initiation date/time: 1/10/1841    Skin to skin with: Mother  Skin to skin end date/time:        Labor Events and Shoulder Dystocia    Fetal Tracing Prior to Delivery: Category 1  Shoulder dystocia present?: Neg     Delivery (Maternal) (Provider to Complete) ([id number])    Episiotomy: None  Perineal lacerations: 2nd Repaired?: Yes   Repair suture: 3-0 Rapide  Genital tract inspection done: Pos     Blood Loss  Mother: Gwyn Quintanilla #[id number]   Start of Mother's Information    Delivery Blood Loss  10/09/22 0130 - 10/09/22 0436    Delivery QBL (mL) Hospital Encounter 100 mL    Total  100 mL         End of Mother's Information  Mother: Gwyn Quintanilla #[id number]          Delivery - Provider to Complete ([id number])    Delivering clinician: Donita De Santiago MD  Delivery Type (Choose the 1 that will go to the Birth History): Vaginal, Spontaneous                   Other personnel:  Provider Role   Donita De Santiago MD Obstetrician   Shane Hagan, RN Registered Nurse   Marli Anderson RN Registered Nurse                Placenta    Removal: Spontaneous  Disposition: Hospital disposal           Anesthesia    Method: None                Presentation and Position    Presentation: Vertex    Position: Left Occiput                  Donita De Santiago MD

## 2022-10-09 NOTE — PROVIDER NOTIFICATION
Dr. De Santiago notified of patient total QBL of 513 with a boggy uterus needing massage to become firm. RN to give IV pitocin and continue to monitor.

## 2022-10-09 NOTE — PROVIDER NOTIFICATION
Dr. Yen updated of SVE of 4/60/-2. Patient sanjuanita every 5 -7 minutes, with a category 1 tracing. Patient blood pressure 132/74. Per MD to allow patient to rest and continue to monitor, plan to start pitocin in am.

## 2022-10-10 VITALS
DIASTOLIC BLOOD PRESSURE: 77 MMHG | RESPIRATION RATE: 18 BRPM | TEMPERATURE: 98.3 F | HEART RATE: 71 BPM | SYSTOLIC BLOOD PRESSURE: 141 MMHG | OXYGEN SATURATION: 98 %

## 2022-10-10 PROBLEM — Z34.90 ENCOUNTER FOR INDUCTION OF LABOR: Status: ACTIVE | Noted: 2022-10-10

## 2022-10-10 PROBLEM — O13.9 GESTATIONAL HYPERTENSION: Status: ACTIVE | Noted: 2022-10-10

## 2022-10-10 PROCEDURE — 250N000013 HC RX MED GY IP 250 OP 250 PS 637: Performed by: OBSTETRICS & GYNECOLOGY

## 2022-10-10 RX ADMIN — IBUPROFEN 800 MG: 800 TABLET ORAL at 07:01

## 2022-10-10 ASSESSMENT — ACTIVITIES OF DAILY LIVING (ADL)
ADLS_ACUITY_SCORE: 18

## 2022-10-10 NOTE — PLAN OF CARE
Pain controlled with Ibuprofen and Tylenol. VSS. Fundus firm at umbilicus. Scant rubra, no clots. Voiding. Breastfeeding and bonding well with baby.

## 2022-10-10 NOTE — CONSULTS
"ACUPUNCTURIST TREATMENT NOTE    Name: Gwyn Quintanilla  :  1990  MRN:  [id number]    Acupuncture Treatment  Patient Type: Maternity  Intervention Reason: Lactation  Patient complaint:: Insufficient lactation  Initial insertions: Sixto 17, Gb 21, Si 1, Sp 6, St 36         \"Risks and benefits of acupuncture were discussed with patient. Consent for treatment was given. We thank you for the referral.\"     Geovani Wilson    Date:  10/10/2022  Time:  11:24 AM    "

## 2022-10-24 NOTE — H&P
[facility] Labor and Delivery History and Physical    Gwyn Quintanilla MRN# [id number]   Age: 32 year old YOB: 1990     Date of Admission:  10/8/2022    Primary care provider: Peg Mahan           Chief Complaint:   Gwyn Quintanilla is a 32 year old female who is Unknown pregnant and being admitted for  induction of labor due to chronic hypertension .          Pregnancy history:     OBSTETRIC HISTORY:    OB History    Para Term  AB Living   2 2 2 0 0 2   SAB IAB Ectopic Multiple Live Births   0 0 0 0 2      # Outcome Date GA Lbr Chay/2nd Weight Sex Delivery Anes PTL Lv   2 Term 10/09/22 38w6d 01:50 / 00:02 3.742 kg (8 lb 4 oz) M Vag-Spont None N RADHA      Name: DANIELMALE-GWYN      Apgar1: 9  Apgar5: 9   1 Term 21 37w5d  3.062 kg (6 lb 12 oz) F Vag-Spont EPI  RADHA      Name: DANIELFEMALE-GWYN      Apgar1: 8  Apgar5: 9       EDC: Estimated Date of Delivery: None noted.    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   .        Maternal Past Medical History:     Past Medical History:   Diagnosis Date     Abnormal Pap smear of cervix      Abnormal vaginal Pap smear      Anxiety      Astigmatism      Cervical dysplasia     CINDY I     Depression      Excessive sweating      Gestational hypertension 2021     H/O colposcopy with cervical biopsy      Hypertension      Keratosis pilaris      Myopia      Single  liveborn infant delivered vaginally 2021                       Family History:     Family History   Problem Relation Age of Onset     Breast Cancer Mother 42.00        on hormones for premature menopause     Breast Cancer Maternal Grandmother          in 70's from Hepatitis C      Crohn's Disease Father      Pancreatitis Sister      Alcoholism Sister      Cataracts Maternal Grandfather          in 60's     Chronic Obstructive Pulmonary Disease Maternal Grandfather      Diabetes Maternal Uncle      Family history reviewed and updated in [facility]            Social History:     Social History     Tobacco Use     Smoking status: Never     Smokeless tobacco: Never   Substance Use Topics     Alcohol use: Not Currently            Review of Systems:   The Review of Systems is negative other than noted in the HPI          Physical Exam:   Vitals were reviewed                    Constitutional:   awake, alert, cooperative, no apparent distress, and appears stated age     Lungs:   No increased work of breathing, good air exchange, clear to auscultation bilaterally, no crackles or wheezing     Cardiovascular:   Normal apical impulse, regular rate and rhythm, normal S1 and S2, no S3 or S4, and no murmur noted     Abdomen:   No scars, normal bowel sounds, soft, non-distended, non-tender, no masses palpated, no hepatosplenomegally and gravid      Cervix:   Membranes: intact   Dilation: 1   Effacement: long   Station:-2   Consistency: average   Position: Posterior  Presentation:Vertex  Fetal Heart Rate Tracing: Tier 1 (normal)  Tocometer: inadequate                       Assessment:   Gwyn Quintanilla is a Unknown pregnant female admitted with induction of labor, indication - chronic hypertension .          Plan:   Admit - see IP orders  cervical ripening with misoprostol  Anticipate spontaneous vaginal delivery      Donita De Santiago MD

## 2022-11-09 ENCOUNTER — OFFICE VISIT (OUTPATIENT)
Dept: FAMILY MEDICINE | Facility: CLINIC | Age: 32
End: 2022-11-09
Payer: COMMERCIAL

## 2022-11-09 VITALS
HEART RATE: 81 BPM | OXYGEN SATURATION: 98 % | HEIGHT: 70 IN | RESPIRATION RATE: 16 BRPM | TEMPERATURE: 98.5 F | BODY MASS INDEX: 28.63 KG/M2 | SYSTOLIC BLOOD PRESSURE: 120 MMHG | DIASTOLIC BLOOD PRESSURE: 70 MMHG | WEIGHT: 200 LBS

## 2022-11-09 DIAGNOSIS — H69.93 DYSFUNCTION OF BOTH EUSTACHIAN TUBES: Primary | ICD-10-CM

## 2022-11-09 PROCEDURE — 99213 OFFICE O/P EST LOW 20 MIN: CPT | Performed by: FAMILY MEDICINE

## 2022-11-09 ASSESSMENT — PAIN SCALES - GENERAL: PAINLEVEL: MODERATE PAIN (5)

## 2022-11-09 NOTE — PATIENT INSTRUCTIONS
You can start with a sinus rinse bottle, by Maria G first.     If this doesn't work then I'd try Zrytec (cetirizine) or Claritin (loratadine) next and then lastly I'd do flonase    If still not getting better, reach out via mychart.

## 2022-11-09 NOTE — PROGRESS NOTES
"  Assessment & Plan     Dysfunction of both eustachian tubes  -Discussed that there is no sign of infection today thankfully but she does have effusions bilaterally consistent with eustachian tube dysfunction.  Given her nursing status this is challenging to treat and we did discuss options.  I recommend starting with nasal saline rinses such as with a NeilMed sinus rinse bottle first, she can continue to use Afrin occasionally as well and if these things are not helping the next step I think would be Zyrtec daily lastly I would have her try either some Mucinex or some Flonase nasal spray.  If her pain is worsening she can let me know and certainly we could trial a course of antibiotics as well.           BMI:   Estimated body mass index is 29.11 kg/m  as calculated from the following:    Height as of this encounter: 1.765 m (5' 9.5\").    Weight as of this encounter: 90.7 kg (200 lb).   Weight management plan: not discussed today, patient is recently postpartum      No follow-ups on file.    Peg Mahan MD  [facility]    Sammi Pascal is a 32 year old, presenting for the following health issues:  Ear Problem (Ear pain fluid in ears, no other cold sx at this time. No fever )      History of Present Illness       Reason for visit:  Ear pain  Symptom onset:  1-2 weeks ago  Symptoms include:  Ear pair, fluid in ears  Symptom intensity:  Moderate  Symptom progression:  Worsening  Had these symptoms before:  No  What makes it worse:  Movement of head  What makes it better:  Ibuprofen    She eats 2-3 servings of fruits and vegetables daily.She consumes 1 sweetened beverage(s) daily.She exercises with enough effort to increase her heart rate 9 or less minutes per day.  She exercises with enough effort to increase her heart rate 3 or less days per week. She is missing 1 dose(s) of medications per week.  She is not taking prescribed medications regularly due to remembering to take.   " "    She has been sick for the last 3 weeks, is four weeks postpartum. Is congested, has been breast feeding, did Afrin for 3 days. It did work pretty well initially. Could get her ears to pop and clear. Both ears are plugged and can't get them to unplug.     She is getting ear pain, worried about an ear infection.She can maybe feel fluid moving.     Review of Systems   Per above      Objective    /70   Pulse 81   Temp 98.5  F (36.9  C)   Resp 16   Ht 1.765 m (5' 9.5\")   Wt 90.7 kg (200 lb)   SpO2 98%   Breastfeeding Yes   BMI 29.11 kg/m    Body mass index is 29.11 kg/m .  Physical Exam   GENERAL: healthy, alert and no distress  HENT: normal cephalic/atraumatic, both ears: clear effusion, oral mucous membranes moist and some cobblestoning posterior pharynx  NECK: no adenopathy, no asymmetry, masses, or scars and thyroid normal to palpation  RESP: lungs clear to auscultation - no rales, rhonchi or wheezes  CV: regular rate and rhythm, normal S1 S2, no S3 or S4, no murmur, click or rub, no peripheral edema and peripheral pulses strong  PSYCH: mentation appears normal, affect normal/bright          "

## 2022-11-28 ENCOUNTER — LAB REQUISITION (OUTPATIENT)
Dept: LAB | Facility: CLINIC | Age: 32
End: 2022-11-28

## 2022-11-28 DIAGNOSIS — Z12.4 ENCOUNTER FOR SCREENING FOR MALIGNANT NEOPLASM OF CERVIX: ICD-10-CM

## 2022-11-28 PROCEDURE — G0145 SCR C/V CYTO,THINLAYER,RESCR: HCPCS | Performed by: OBSTETRICS & GYNECOLOGY

## 2022-11-28 PROCEDURE — 87624 HPV HI-RISK TYP POOLED RSLT: CPT | Performed by: OBSTETRICS & GYNECOLOGY

## 2022-12-01 LAB
BKR LAB AP GYN ADEQUACY: NORMAL
BKR LAB AP GYN INTERPRETATION: NORMAL
BKR LAB AP HPV REFLEX: NORMAL
BKR LAB AP LMP: NORMAL
BKR LAB AP PREVIOUS ABNL DX: NORMAL
BKR LAB AP PREVIOUS ABNORMAL: NORMAL
PATH REPORT.COMMENTS IMP SPEC: NORMAL
PATH REPORT.COMMENTS IMP SPEC: NORMAL
PATH REPORT.RELEVANT HX SPEC: NORMAL

## 2022-12-05 LAB
HUMAN PAPILLOMA VIRUS 16 DNA: NEGATIVE
HUMAN PAPILLOMA VIRUS 18 DNA: NEGATIVE
HUMAN PAPILLOMA VIRUS FINAL DIAGNOSIS: NORMAL
HUMAN PAPILLOMA VIRUS OTHER HR: NEGATIVE

## 2023-03-29 ENCOUNTER — OFFICE VISIT (OUTPATIENT)
Dept: FAMILY MEDICINE | Facility: CLINIC | Age: 33
End: 2023-03-29
Payer: COMMERCIAL

## 2023-03-29 VITALS
SYSTOLIC BLOOD PRESSURE: 132 MMHG | OXYGEN SATURATION: 97 % | TEMPERATURE: 97.9 F | RESPIRATION RATE: 15 BRPM | DIASTOLIC BLOOD PRESSURE: 87 MMHG | HEART RATE: 74 BPM

## 2023-03-29 DIAGNOSIS — R07.0 THROAT PAIN: Primary | ICD-10-CM

## 2023-03-29 LAB
DEPRECATED S PYO AG THROAT QL EIA: NEGATIVE
GROUP A STREP BY PCR: NOT DETECTED

## 2023-03-29 PROCEDURE — 87651 STREP A DNA AMP PROBE: CPT | Performed by: PHYSICIAN ASSISTANT

## 2023-03-29 PROCEDURE — 99213 OFFICE O/P EST LOW 20 MIN: CPT | Performed by: PHYSICIAN ASSISTANT

## 2023-03-29 RX ORDER — IBUPROFEN 200 MG
200 TABLET ORAL EVERY 4 HOURS PRN
COMMUNITY

## 2023-03-29 RX ORDER — PRENATAL VIT/IRON FUM/FOLIC AC 27MG-0.8MG
1 TABLET ORAL DAILY
COMMUNITY

## 2023-03-29 NOTE — PROGRESS NOTES
Assessment & Plan:      Problem List Items Addressed This Visit    None  Visit Diagnoses     Throat pain    -  Primary    Relevant Orders    Streptococcus A Rapid Screen w/Reflex to PCR - Clinic Collect (Completed)    Group A Streptococcus PCR Throat Swab        Medical Decision Making  Patient presents with acute onset throat pain and right ear pain with known exposure to strep pharyngitis.  Rapid strep is negative at this time and there is no signs of significant bacterial tonsillitis seen.  Recommend continuing to monitor symptoms.  Discussed treatment and symptomatic care.  Allergies and medication interactions reviewed.  Discussed signs of worsening symptoms and when to follow-up with PCP if no symptom improvement.     Subjective:      Gwyn Quintanilla is a 32 year old female here for evaluation of throat pain and right ear discomfort.  Onset of symptoms was 1 to 2 days ago.  Patient has 2 children at home that recently tested positive for strep pharyngitis.  Patient otherwise denies fevers     The following portions of the patient's history were reviewed and updated as appropriate: allergies, current medications, and problem list.     Review of Systems  Pertinent items are noted in HPI.    Allergies  No Known Allergies    Family History   Problem Relation Age of Onset     Breast Cancer Mother 42.00        on hormones for premature menopause     Breast Cancer Maternal Grandmother          in 70's from Hepatitis C      Crohn's Disease Father      Pancreatitis Sister      Alcoholism Sister      Cataracts Maternal Grandfather          in 60's     Chronic Obstructive Pulmonary Disease Maternal Grandfather      Diabetes Maternal Uncle        Social History     Tobacco Use     Smoking status: Never     Smokeless tobacco: Never   Substance Use Topics     Alcohol use: Not Currently        Objective:      /87   Pulse 74   Temp 97.9  F (36.6  C)   Resp 15   SpO2 97%   Breastfeeding No   General  appearance - alert, well appearing, and in no distress and non-toxic  Ears - bilateral TM's and external ear canals normal  Mouth - mucous membranes moist, pharynx normal without lesions  Neck - Mild to moderate bilateral anterior cervical lymphadenopathy     Lab & Imaging Results    Results for orders placed or performed in visit on 03/29/23   Streptococcus A Rapid Screen w/Reflex to PCR - Clinic Collect     Status: Normal    Specimen: Throat; Swab   Result Value Ref Range    Group A Strep antigen Negative Negative       I personally reviewed these results and discussed findings with the patient.    The use of Dragon/[employer] dictation services was used to construct the content of this note; any grammatical errors are non-intentional. Please contact the author directly if you are in need of any clarification.

## 2023-05-13 ENCOUNTER — HEALTH MAINTENANCE LETTER (OUTPATIENT)
Age: 33
End: 2023-05-13

## 2023-06-08 ENCOUNTER — LAB (OUTPATIENT)
Dept: LAB | Facility: CLINIC | Age: 33
End: 2023-06-08
Payer: COMMERCIAL

## 2023-06-08 DIAGNOSIS — H20.022 IRITIS, RECURRENT, LEFT: ICD-10-CM

## 2023-06-08 DIAGNOSIS — H20.022 IRITIS, RECURRENT, LEFT: Primary | ICD-10-CM

## 2023-06-08 PROCEDURE — 36415 COLL VENOUS BLD VENIPUNCTURE: CPT

## 2023-06-08 PROCEDURE — 82164 ANGIOTENSIN I ENZYME TEST: CPT | Mod: 90

## 2023-06-08 PROCEDURE — 99000 SPECIMEN HANDLING OFFICE-LAB: CPT

## 2023-06-09 LAB — ACE SERPL-CCNC: 61 U/L

## 2023-07-11 ENCOUNTER — TELEPHONE (OUTPATIENT)
Dept: FAMILY MEDICINE | Facility: CLINIC | Age: 33
End: 2023-07-11
Payer: COMMERCIAL

## 2023-07-11 NOTE — TELEPHONE ENCOUNTER
General Call      Reason for Call:  results    What are your questions or concerns:  Results of lab work on 6/8    Date of last appointment with provider:     Could we send this information to you in [facility] [employer] or would you prefer to receive a phone call?:   Patient would like to be contacted via [employer]       Pt would to discuss the results of lab work, Eye doctor originally ordered labs, would like to make sure results are sent to that provider.    [facility]

## 2023-07-12 DIAGNOSIS — H20.029 RECURRENT ACUTE IRIDOCYCLITIS, UNSPECIFIED LATERALITY: Primary | ICD-10-CM

## 2023-07-12 NOTE — TELEPHONE ENCOUNTER
HLA B27 not drawn. Lab staff notified. Pt will need to come back for additional appointment. [facility] order in chart. [employer] message sent to patient as requested.

## 2023-07-14 ENCOUNTER — LAB (OUTPATIENT)
Dept: LAB | Facility: CLINIC | Age: 33
End: 2023-07-14
Payer: COMMERCIAL

## 2023-07-14 DIAGNOSIS — H20.029 RECURRENT ACUTE IRIDOCYCLITIS, UNSPECIFIED LATERALITY: ICD-10-CM

## 2023-07-14 PROCEDURE — 81374 HLA I TYPING 1 ANTIGEN LR: CPT

## 2023-07-14 PROCEDURE — 36415 COLL VENOUS BLD VENIPUNCTURE: CPT

## 2023-07-26 LAB
B LOCUS: NORMAL
B27TEST METHOD: NORMAL

## 2023-07-31 ENCOUNTER — TELEPHONE (OUTPATIENT)
Dept: FAMILY MEDICINE | Facility: CLINIC | Age: 33
End: 2023-07-31
Payer: COMMERCIAL

## 2023-07-31 NOTE — TELEPHONE ENCOUNTER
Test Results        Who ordered the test:  Dr. Vane OD    Type of test: Lab    Date of test:  7/14/23    Where was the test performed:  [facility] lab    What are your questions/concerns?:  Pt is requesting physical copy of lab results to be picked up at , please call once ready for     Could we [facility] this information to you [facility] or would you prefer to receive a phone call?:   Patient would prefer a phone call   Okay to leave a detailed message?: Yes at Cell number on file:    Telephone Information:   Mobile [phone]

## 2023-08-09 NOTE — TELEPHONE ENCOUNTER
Patient Returning Call    Reason for call:  call back    Information relayed to patient:  Pt would like to have lab results for  be mailed to her.    Lab results for  have been prepared to be sent

## 2023-12-01 ENCOUNTER — LAB REQUISITION (OUTPATIENT)
Dept: LAB | Facility: CLINIC | Age: 33
End: 2023-12-01

## 2023-12-01 DIAGNOSIS — Z11.3 ENCOUNTER FOR SCREENING FOR INFECTIONS WITH A PREDOMINANTLY SEXUAL MODE OF TRANSMISSION: ICD-10-CM

## 2023-12-01 DIAGNOSIS — Z12.4 ENCOUNTER FOR SCREENING FOR MALIGNANT NEOPLASM OF CERVIX: ICD-10-CM

## 2023-12-01 PROCEDURE — 87591 N.GONORRHOEAE DNA AMP PROB: CPT | Performed by: NURSE PRACTITIONER

## 2023-12-01 PROCEDURE — 87624 HPV HI-RISK TYP POOLED RSLT: CPT | Performed by: NURSE PRACTITIONER

## 2023-12-01 PROCEDURE — G0145 SCR C/V CYTO,THINLAYER,RESCR: HCPCS | Performed by: NURSE PRACTITIONER

## 2023-12-01 PROCEDURE — 87491 CHLMYD TRACH DNA AMP PROBE: CPT | Performed by: NURSE PRACTITIONER

## 2023-12-02 LAB
C TRACH DNA SPEC QL PROBE+SIG AMP: NEGATIVE
N GONORRHOEA DNA SPEC QL NAA+PROBE: NEGATIVE

## 2023-12-14 ENCOUNTER — LAB (OUTPATIENT)
Dept: LAB | Facility: CLINIC | Age: 33
End: 2023-12-14
Payer: COMMERCIAL

## 2023-12-14 ENCOUNTER — OFFICE VISIT (OUTPATIENT)
Dept: RHEUMATOLOGY | Facility: CLINIC | Age: 33
End: 2023-12-14
Payer: COMMERCIAL

## 2023-12-14 VITALS
OXYGEN SATURATION: 99 % | WEIGHT: 176 LBS | SYSTOLIC BLOOD PRESSURE: 138 MMHG | DIASTOLIC BLOOD PRESSURE: 80 MMHG | BODY MASS INDEX: 25.62 KG/M2

## 2023-12-14 DIAGNOSIS — Z87.59 HISTORY OF PRE-ECLAMPSIA: ICD-10-CM

## 2023-12-14 DIAGNOSIS — H20.9 IRITIS: Primary | Chronic | ICD-10-CM

## 2023-12-14 DIAGNOSIS — Z83.79 FAMILY HISTORY OF CROHN'S DISEASE: ICD-10-CM

## 2023-12-14 DIAGNOSIS — H20.9 IRITIS: ICD-10-CM

## 2023-12-14 LAB
ALBUMIN UR-MCNC: NEGATIVE MG/DL
APPEARANCE UR: CLEAR
BACTERIA #/AREA URNS HPF: ABNORMAL /HPF
BILIRUB UR QL STRIP: NEGATIVE
COLOR UR AUTO: YELLOW
GLUCOSE UR STRIP-MCNC: NEGATIVE MG/DL
HGB UR QL STRIP: NEGATIVE
KETONES UR STRIP-MCNC: NEGATIVE MG/DL
LEUKOCYTE ESTERASE UR QL STRIP: ABNORMAL
NITRATE UR QL: NEGATIVE
PH UR STRIP: 7 [PH] (ref 5–8)
RBC #/AREA URNS AUTO: ABNORMAL /HPF
SP GR UR STRIP: 1.01 (ref 1–1.03)
SQUAMOUS #/AREA URNS AUTO: ABNORMAL /LPF
UROBILINOGEN UR STRIP-ACNC: 0.2 E.U./DL
WBC #/AREA URNS AUTO: ABNORMAL /HPF

## 2023-12-14 PROCEDURE — 81001 URINALYSIS AUTO W/SCOPE: CPT

## 2023-12-14 PROCEDURE — 99204 OFFICE O/P NEW MOD 45 MIN: CPT | Performed by: INTERNAL MEDICINE

## 2023-12-14 RX ORDER — LEVONORGESTREL 52 MG/1
INTRAUTERINE DEVICE INTRAUTERINE
COMMUNITY
Start: 2023-12-01

## 2023-12-14 NOTE — PROGRESS NOTES
This document was created using a software with less than 100% fidelity, at times resulting in unintended, even erroneous syntax and grammar.  The reader is advised to keep this under consideration while reviewing, interpreting this note.      Rheumatology Consult Note      Gwyn Quintanilla     YOB: 1990 Age: 33 year old    Date of visit: 12/14/23    PCP: Peg Mahan    Chief Complaint   Patient presents with:  Consult      Assessment and Plan     Gwyn was seen today for consult.    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  Her first episode was 2019.  She had extensive workup done at that time which is reviewed and referred to.  There is no clinical signal here that would suggest development of one of the associated syndromes since 2019 workup.  The only change she has been she has had 2 pregnancies, associated with hypertension, prior preeclampsi  Her father was noted to have Crohn's disease.  Her own  HLA-B27 is negative.  Today I had a detailed discussion held around further workup.   The only bit of the workup that may be reasonable to repeat from 2019 given her overall clinical situation would be a urinalysis which can be clinically quiet organ in terms of ongoing inflammation.  Besides establishing a associated syndrome from a rheumatologic perspective I would like for the second reason there is an interface between ophthalmology and rheumatology and iritis is when the decision by the patient and the ophthalmologist is that she should be on chronic immunosuppression then we can talk about options at appears not to be the case at this time.  She would follow-up on as-needed basis.       HPI   Gwyn Quintanilla is a 33  "year old female  is seen today for evaluation of iritis from a rheumatologic perspective.  She is an RN now working at [employer] and was seen here in 2019.  Since then she had a second episode of iritis affecting her left eye.  This was in the summer of this year, responding to topical steroids did not require mild steroids, she is \"steroid responder\" by what she meant at risk for glaucoma and is on treatment for that.  In the interim between 2019 and today she has had 2 pregnancies which were associated with preeclampsia.  She has not had any rash, mouth ulcers, photosensitivity, pleuritic symptoms, features of inflammatory joint problem.  She has not had features suggestive of erythema nodosum.  There is no oral or genital ulceration.  As noted her dad is known to have Crohn's disease and he is on IVs now.  Her HLA-B27 was negative.  Apart from sense of cramping occasionally in the fingers she has not had swelling stiffness or pain.  There is no stiffness in the morning when she wakes up.  She has not had fever, weight loss, she has not had a cough.  There is no hematuria hemoptysis.  She reports no known hematuria.  There has been no wrist or foot drop there is no numbness or tingling.  She has not had recurrent chronic sinusitis, apart from episode in the past where she had recurrent episodes and had antibiotics for sinusitis which has since cleared.  No asthma.  She would describe herself all in all in good health.  She is not a cigarette smoker.  Alcohol rarely..  Apart from dad's history which is relevant no family history of psoriasis.  No family history of ankylosing spondylitis rheumatoid arthritis or lupus.           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    Chronic sinusitis, unspecified location    " Pregnancy    Gestational hypertension    Encounter for induction of labor     Past Medical History     Past Medical History:   Diagnosis Date    Abnormal Pap smear of cervix     Abnormal vaginal Pap smear     Anxiety     Astigmatism     Cervical dysplasia     CINDY I    Depression     Excessive sweating     Gestational hypertension 2021    H/O colposcopy with cervical biopsy     Hypertension     Keratosis pilaris     Myopia     Single liveborn infant delivered vaginally 2021     Past Surgical History     Past Surgical History:   Procedure Laterality Date    LASIK  2017    TONSILLECTOMY  1994    TOOTH EXTRACTION       Allergy   No Known Allergies  Current Medication List     Current Outpatient Medications   Medication Sig    Docosahexaenoic Acid (PRENATAL DHA) 200 MG capsule Prenatal Vitamin 27 mg iron-800 mcg tablet   Take by oral route. (Patient not taking: Reported on 3/29/2023)    ibuprofen (ADVIL/MOTRIN) 200 MG tablet Take 200 mg by mouth every 4 hours as needed for pain    Prenatal Vit-Fe Fumarate-FA (PRENATAL MULTIVITAMIN W/IRON) 27-0.8 MG tablet Take 1 tablet by mouth daily    vitamin D3 (CHOLECALCIFEROL) 50 mcg (2000 units) tablet  (Patient not taking: Reported on 3/29/2023)     No current facility-administered medications for this visit.            Family History     Family History   Problem Relation Age of Onset    Breast Cancer Mother 42.00        on hormones for premature menopause    Breast Cancer Maternal Grandmother          in 70's from Hepatitis C     Crohn's Disease Father     Pancreatitis Sister     Alcoholism Sister     Cataracts Maternal Grandfather          in 60's    Chronic Obstructive Pulmonary Disease Maternal Grandfather     Diabetes Maternal Uncle          Physical Exam     COMPREHENSIVE EXAMINATION:  Vitals:    23 1535   BP: 138/80   SpO2: 99%   Weight: 79.8 kg (176 lb)     A well appearing alert oriented female. Vital data as noted above. Her eyes examined  for inflammation/scleromalacia. ENT examined for oral mucositis, moisture, thrush, nasal deformity, external ear redness, deformity. Her neck is examined for suppleness and lymphadenopathy.  Cardiopulmonary examination without dyspnea at rest, use of accessory muscles of breathing, wheezing, edema, peripheral or central cyanosis.  Abdomen examined for softness, tenderness, obvious organomegaly.  Skin examined for heliotrope, malar area eruption, lupus pernio, periungual erythema, sclerodactyly, papules, erythema nodosum, purpura, nail pitting, onycholysis, and obvious psoriasis lesion. Neurological examination done for alertness, speech, facial symmetry,  tone and power in upper and lower extremities, and gait. The joint examination is performed for swelling, tenderness, warmth, erythema, and range of motion in the following joints: DIPs, PIPs, MCPs, wrists, first CMC's, elbows, shoulders, hips, knees, ankles, feet; spine for range of motion and paraspinal muscles for tenderness. The salient normal / abnormal findings are appended.  There is no stomatitis.  There is no rash on her face she does not have sclerodactyly.  There is no synovitis of palpable joints he does not have dactylitis.  There is no tenderness across trapezius paraspinal region.  She does not have erythema nodosum or scarring there.  Her gait is normal.    Labs / Imaging (select studies)     Uric Acid   Date Value Ref Range Status   04/24/2021 6.2 2.0 - 7.5 mg/dL Final      Hemoglobin   Date Value Ref Range Status   10/08/2022 13.8 11.7 - 15.7 g/dL Final   04/25/2021 11.7 (L) 12.0 - 16.0 g/dL Final   04/24/2021 12.1 12.0 - 16.0 g/dL Final     Urea Nitrogen   Date Value Ref Range Status   07/17/2018 11 8 - 22 mg/dL Final     AST   Date Value Ref Range Status   10/08/2022 15 0 - 40 U/L Final   04/25/2021 34 0 - 40 U/L Final   04/24/2021 53 (H) 0 - 40 U/L Final     Albumin   Date Value Ref Range Status   08/07/2019 4.2 3.5 - 5.0 g/dL Final    07/17/2018 4.3 3.5 - 5.0 g/dL Final     Alkaline Phosphatase   Date Value Ref Range Status   07/17/2018 56 45 - 120 U/L Final     ALT   Date Value Ref Range Status   10/08/2022 13 0 - 45 U/L Final   04/25/2021 68 (H) 0 - 45 U/L Final   04/24/2021 86 (H) 0 - 45 U/L Final          Immunization History     Immunization History   Administered Date(s) Administered    COVID-19 MONOVALENT 12+ (Pfizer) 05/19/2021, 06/09/2021    COVID-19 Monovalent Booster 18+ (Moderna) 12/28/2021    Flu, Unspecified 11/15/2010, 10/31/2011, 10/24/2012, 11/04/2013    [id number] Novel Flu 12/17/2009    HPV Bivalent 04/23/2013, 06/28/2013, 01/10/2014    HPV Quadrivalent 04/23/2013, 06/28/2013    HepA, Unspecified 09/21/2007    Hepatitis B, Peds 07/30/2003, 09/03/2003, 04/15/2004    Hib, Unspecified 02/23/1990    Historical DTP/aP 1990, 1990, 02/06/1989, 02/23/1990, 06/18/1993    Influenza (H1N1) 12/17/2009    Influenza Vaccine >6 months,quad, PF 09/19/2017, 10/09/2019, 10/13/2021, 09/28/2022    MMR 11/20/1989, 06/30/2000    Meningococcal Mcv4 Conjugate,unspecified  08/14/2006    OPV, trivalent, live 1990, 1990, 02/23/1990, 06/18/1993    TDAP (Adacel,Boostrix) 02/15/2021, 07/25/2022    Td,adult,historic,unspecified 06/30/2000, 01/01/2008

## 2023-12-28 NOTE — PROGRESS NOTES
Dr. Yen called back. Discussed Blood Pressures and SVE. Per Dr. Yen no treatment on BP for now. Continue to monitor.   Plan is to discuss rupturing with patient. Dr. Yen will contact Dr. De Santiago for Rupture.       Dr. Nelson called back at 1900 will come rupture after shift change.    Report given to ONELIA Lynn RN   
ONELIA House and ONELIA Lynn spoke with patient about AROM. At this time patient does not want to be ruptured.     ONELIA Lynn Paged Dr. Yen to follow up with plan.     Report given to Shane Ayala RN   
Outreach Note for [facility]    Gwyn JUANA Quintanilla  [id number]  1990    Discharge follow-up plan discussed with patient, needs assessed. Pt requests all follow-up through clinic/physician, declines home care visit, unless medically indicated and ordered by physician, and declines follow-up phone call.   No further needs identified at this time.      
Patient/Caregiver provided printed discharge information.

## 2025-01-12 ENCOUNTER — HEALTH MAINTENANCE LETTER (OUTPATIENT)
Age: 35
End: 2025-01-12

## 2025-04-09 SDOH — HEALTH STABILITY: PHYSICAL HEALTH: ON AVERAGE, HOW MANY DAYS PER WEEK DO YOU ENGAGE IN MODERATE TO STRENUOUS EXERCISE (LIKE A BRISK WALK)?: 4 DAYS

## 2025-04-09 SDOH — HEALTH STABILITY: PHYSICAL HEALTH: ON AVERAGE, HOW MANY MINUTES DO YOU ENGAGE IN EXERCISE AT THIS LEVEL?: 30 MIN

## 2025-04-09 ASSESSMENT — SOCIAL DETERMINANTS OF HEALTH (SDOH): HOW OFTEN DO YOU GET TOGETHER WITH FRIENDS OR RELATIVES?: ONCE A WEEK

## 2025-04-14 ENCOUNTER — OFFICE VISIT (OUTPATIENT)
Dept: INTERNAL MEDICINE | Facility: CLINIC | Age: 35
End: 2025-04-14
Payer: COMMERCIAL

## 2025-04-14 ENCOUNTER — PATIENT OUTREACH (OUTPATIENT)
Dept: ONCOLOGY | Facility: CLINIC | Age: 35
End: 2025-04-14

## 2025-04-14 VITALS
SYSTOLIC BLOOD PRESSURE: 136 MMHG | RESPIRATION RATE: 16 BRPM | OXYGEN SATURATION: 99 % | HEIGHT: 69 IN | TEMPERATURE: 97.8 F | HEART RATE: 84 BPM | WEIGHT: 186 LBS | BODY MASS INDEX: 27.55 KG/M2 | DIASTOLIC BLOOD PRESSURE: 96 MMHG

## 2025-04-14 DIAGNOSIS — Z23 IMMUNIZATION DUE: ICD-10-CM

## 2025-04-14 DIAGNOSIS — Z78.9 NOT IMMUNE TO MEASLES: ICD-10-CM

## 2025-04-14 DIAGNOSIS — Z00.00 ENCOUNTER FOR ANNUAL PHYSICAL EXAM: Primary | ICD-10-CM

## 2025-04-14 DIAGNOSIS — Z12.31 VISIT FOR SCREENING MAMMOGRAM: ICD-10-CM

## 2025-04-14 DIAGNOSIS — Z13.1 SCREENING FOR DIABETES MELLITUS: ICD-10-CM

## 2025-04-14 DIAGNOSIS — Z80.3 FAMILY HISTORY OF MALIGNANT NEOPLASM OF BREAST: ICD-10-CM

## 2025-04-14 DIAGNOSIS — Z12.39 BREAST CANCER SCREENING, HIGH RISK PATIENT: ICD-10-CM

## 2025-04-14 PROBLEM — Z34.90 PREGNANT: Status: RESOLVED | Noted: 2021-04-19 | Resolved: 2025-04-14

## 2025-04-14 PROBLEM — Z34.90 ENCOUNTER FOR INDUCTION OF LABOR: Status: RESOLVED | Noted: 2022-10-10 | Resolved: 2025-04-14

## 2025-04-14 PROBLEM — H20.9 IRITIS: Chronic | Status: ACTIVE | Noted: 2019-11-14

## 2025-04-14 PROBLEM — H20.9 IRITIS: Chronic | Status: RESOLVED | Noted: 2019-11-14 | Resolved: 2025-04-14

## 2025-04-14 PROBLEM — Z34.90 PREGNANCY: Status: RESOLVED | Noted: 2022-10-08 | Resolved: 2025-04-14

## 2025-04-14 LAB
BASOPHILS # BLD AUTO: 0 10E3/UL (ref 0–0.2)
BASOPHILS NFR BLD AUTO: 0 %
CHOLEST SERPL-MCNC: 176 MG/DL
EOSINOPHIL # BLD AUTO: 0.1 10E3/UL (ref 0–0.7)
EOSINOPHIL NFR BLD AUTO: 1 %
ERYTHROCYTE [DISTWIDTH] IN BLOOD BY AUTOMATED COUNT: 12.3 % (ref 10–15)
EST. AVERAGE GLUCOSE BLD GHB EST-MCNC: 100 MG/DL
FASTING STATUS PATIENT QL REPORTED: NO
HBA1C MFR BLD: 5.1 % (ref 0–5.6)
HCT VFR BLD AUTO: 42.2 % (ref 35–47)
HDLC SERPL-MCNC: 49 MG/DL
HGB BLD-MCNC: 14.2 G/DL (ref 11.7–15.7)
IMM GRANULOCYTES # BLD: 0 10E3/UL
IMM GRANULOCYTES NFR BLD: 0 %
LDLC SERPL CALC-MCNC: 106 MG/DL
LYMPHOCYTES # BLD AUTO: 2.2 10E3/UL (ref 0.8–5.3)
LYMPHOCYTES NFR BLD AUTO: 27 %
MCH RBC QN AUTO: 28.9 PG (ref 26.5–33)
MCHC RBC AUTO-ENTMCNC: 33.6 G/DL (ref 31.5–36.5)
MCV RBC AUTO: 86 FL (ref 78–100)
MONOCYTES # BLD AUTO: 0.4 10E3/UL (ref 0–1.3)
MONOCYTES NFR BLD AUTO: 5 %
NEUTROPHILS # BLD AUTO: 5.6 10E3/UL (ref 1.6–8.3)
NEUTROPHILS NFR BLD AUTO: 68 %
NONHDLC SERPL-MCNC: 127 MG/DL
PLATELET # BLD AUTO: 238 10E3/UL (ref 150–450)
RBC # BLD AUTO: 4.92 10E6/UL (ref 3.8–5.2)
TRIGL SERPL-MCNC: 107 MG/DL
TSH SERPL DL<=0.005 MIU/L-ACNC: 1.89 UIU/ML (ref 0.3–4.2)
WBC # BLD AUTO: 8.2 10E3/UL (ref 4–11)

## 2025-04-14 PROCEDURE — 90471 IMMUNIZATION ADMIN: CPT | Performed by: NURSE PRACTITIONER

## 2025-04-14 PROCEDURE — 80061 LIPID PANEL: CPT | Performed by: NURSE PRACTITIONER

## 2025-04-14 PROCEDURE — 83036 HEMOGLOBIN GLYCOSYLATED A1C: CPT | Performed by: NURSE PRACTITIONER

## 2025-04-14 PROCEDURE — 36415 COLL VENOUS BLD VENIPUNCTURE: CPT | Performed by: NURSE PRACTITIONER

## 2025-04-14 PROCEDURE — 99395 PREV VISIT EST AGE 18-39: CPT | Mod: 25 | Performed by: NURSE PRACTITIONER

## 2025-04-14 PROCEDURE — 85025 COMPLETE CBC W/AUTO DIFF WBC: CPT | Performed by: NURSE PRACTITIONER

## 2025-04-14 PROCEDURE — 3075F SYST BP GE 130 - 139MM HG: CPT | Performed by: NURSE PRACTITIONER

## 2025-04-14 PROCEDURE — 3080F DIAST BP >= 90 MM HG: CPT | Performed by: NURSE PRACTITIONER

## 2025-04-14 PROCEDURE — 84443 ASSAY THYROID STIM HORMONE: CPT | Performed by: NURSE PRACTITIONER

## 2025-04-14 PROCEDURE — 90707 MMR VACCINE SC: CPT | Performed by: NURSE PRACTITIONER

## 2025-04-14 NOTE — PATIENT INSTRUCTIONS
Labs today. Will let you know results through [facility] once available.      Mammogram order for screening. Recommend yearly screening.     Pap in 2028.     Screening for colorectal cancer at age 45.     MMR updated.     Eat a quality diet (generally, low in simple sugars, starches, cholesterol and saturated fat.)    Limit alcohol to no more than 1-2 in 24 hours, as higher use increases your weight, can damage your liver or pancreas, and increases triglycerides (fat in the blood). Never drink and drive.    Exercise regularly. Ideally you would have 30 minutes of aerobic exercise at least 4 times weekly. Find something you enjoy and a friend to do it with you.    Apply sun block (SPF 25 or greater) on exposed skin anytime you are out in the sun to prevent skin cancer.     Do breast self-exam once monthly.     Wear a seatbelt whenever you are in a car.    Consider a flu shot every fall.    Follow up in one year, return earlier if needed

## 2025-04-14 NOTE — PROGRESS NOTES
Writer received referral to Cancer Risk Management/Genetic Counseling.    Referred for:   Family history of malignant neoplasm of breast  Breast cancer screening, high risk patient    Per referring note dated 4/14/25:  Family history of malignant neoplasm of breast  Breast cancer screening, high risk patient  Patient's mom has had breast cancer twice, first at age 42 and then again more recently that resulted in double mastectomy.  Negative for genetic markers but was recommended daughters get high risk screenings.  Gwyn states that her sister goes to a breast clinic specifically due to high risk.  Based off of her demographics her breast cancer risk score is approximately 20% lifetime risk.  Due to this a referral was placed to genetic clinic for high risk cancers screening/monitoring to discuss how often she should have a breast MRI.  We discussed possibility of doing a breast MRI every other year in addition to mammograms.  At this time mammogram was ordered as last mammogram was in 2020 and was normal.  No acute breast concerns today.  - MA Screen Bilateral w/Abhilash; Future  - Adult Genetics & Metabolism  Referral; Future    Referred By    Provider Department Location Phone   Morning, Marbella ARIAS NP [facility] [phone]       Referral reviewed for appropriate plan, and sent to New Patient Scheduling ([phone]) for completion.    Светлана Garcia, RN, BSN  Oncology New Patient Nurse Navigator   [facility]

## 2025-04-14 NOTE — PROGRESS NOTES
Preventive Care Visit  [facility], Internal Medicine  Apr 14, 2025      Assessment & Plan     Encounter for annual physical exam  Reviewed overall health maintenance.  Screening labs today as noted below.  Pap due in 2028.  Screening for colorectal cancer start age 45.  Recommend healthy lifestyle with diet and exercise.  Return in 1 year for annual exam.  - REVIEW OF HEALTH MAINTENANCE PROTOCOL ORDERS  - Lipid Profile; Future  - TSH with free T4 reflex; Future  - CBC with Platelets & Differential; Future  - Lipid Profile  - TSH with free T4 reflex  - CBC with Platelets & Differential    Screening for diabetes mellitus  5.1 today.  Recheck in 1 year.  - Hemoglobin A1c; Future  - Hemoglobin A1c    Visit for screening mammogram  Family history of malignant neoplasm of breast  Breast cancer screening, high risk patient  Patient's mom has had breast cancer twice, first at age 42 and then again more recently that resulted in double mastectomy.  Negative for genetic markers but was recommended daughters get high risk screenings.  Gwyn states that her sister goes to a breast clinic specifically due to high risk.  Based off of her demographics her breast cancer risk score is approximately 20% lifetime risk.  Due to this a referral was placed to genetic clinic for high risk cancers screening/monitoring to discuss how often she should have a breast MRI.  We discussed possibility of doing a breast MRI every other year in addition to mammograms.  At this time mammogram was ordered as last mammogram was in 2020 and was normal.  No acute breast concerns today.  - MA Screen Bilateral w/Abhilash; Future  - Adult Genetics & Metabolism  Referral; Future    Not immune to measles  Immunization due  Patient had labs drawn in 2020 that showed no immunity to measles.  It was recommended during her pregnancies that she get seen after delivery to make sure she is given updated vaccine.   "MMR given today.  - MMR (M-M-R II)      BMI  Estimated body mass index is 27.47 kg/m  as calculated from the following:    Height as of this encounter: 1.753 m (5' 9\").    Weight as of this encounter: 84.4 kg (186 lb).   Weight management plan: Discussed healthy diet and exercise guidelines    Counseling  Appropriate preventive services were addressed with this patient via screening, questionnaire, or discussion as appropriate for fall prevention, nutrition, physical activity, Tobacco-use cessation, social engagement, weight loss and cognition.  Checklist reviewing preventive services available has been given to the patient.  Reviewed patient's diet, addressing concerns and/or questions.           Sammi Pascal is a 34 year old, presenting for the following:  Physical (Pt is not fasting)        4/14/2025     1:47 PM   Additional Questions   Roomed by Yaz WELLS  Gwyn is a pleasant 34-year-old female here today for an annual exam.  She is  and has 2 kids.  She works as an RN at [employer].  Concerns today as noted above.         Advance Care Planning  Patient does not have a Health Care Directive: Discussed advance care planning with patient; however, patient declined at this time.      4/9/2025   General Health   How would you rate your overall physical health? Good   Feel stress (tense, anxious, or unable to sleep) Not at all         4/9/2025   Nutrition   Three or more servings of calcium each day? Yes   Diet: Regular (no restrictions)   How many servings of fruit and vegetables per day? (!) 2-3   How many sweetened beverages each day? 0-1         4/9/2025   Exercise   Days per week of moderate/strenous exercise 4 days   Average minutes spent exercising at this level 30 min         4/9/2025   Social Factors   Frequency of gathering with friends or relatives Once a week   Worry food won't last until get money to buy more No   Food not last or not have enough money for food? No   Do " you have housing? (Housing is defined as stable permanent housing and does not include staying ouside in a car, in a tent, in an abandoned building, in an overnight shelter, or couch-surfing.) Yes   Are you worried about losing your housing? No   Lack of transportation? No   Unable to get utilities (heat,electricity)? No         4/9/2025   Dental   Dentist two times every year? Yes           Today's PHQ-2 Score:       4/13/2025     2:43 PM   PHQ-2 ( 1999 [employer])   Q1: Little interest or pleasure in doing things 0   Q2: Feeling down, depressed or hopeless 0   PHQ-2 Score 0    Q1: Little interest or pleasure in doing things Not at all   Q2: Feeling down, depressed or hopeless Not at all   PHQ-2 Score 0       Patient-reported           4/9/2025   Substance Use   Alcohol more than 3/day or more than 7/wk No   Do you use any other substances recreationally? No     Social History     Tobacco Use    Smoking status: Never     Passive exposure: Never    Smokeless tobacco: Never   Vaping Use    Vaping status: Never Used   Substance Use Topics    Alcohol use: Not Currently    Drug use: No           2/24/2022   LAST FHS-7 RESULTS   1st degree relative breast or ovarian cancer Yes   Any relative bilateral breast cancer No   Any male have breast cancer No   Any ONE woman have BOTH breast AND ovarian cancer No   Any woman with breast cancer before 50yrs Yes   2 or more relatives with breast AND/OR ovarian cancer No   2 or more relatives with breast AND/OR bowel cancer No        Mammogram Screening - Annual screen due to greater than 20% lifetime risk as estimated by Breast Cancer Risk Calculator        4/9/2025   STI Screening   New sexual partner(s) since last STI/HIV test? No     History of abnormal Pap smear: No - age 30- 64 PAP with HPV every 5 years recommended        Latest Ref Rng & Units 12/1/2023    11:01 AM 11/28/2022     2:11 PM 6/1/2017     5:00 PM   PAP / HPV   PAP  Negative for Intraepithelial Lesion or Malignancy  "(NILM)  Negative for Intraepithelial Lesion or Malignancy (NILM)  Negative for squamous intraepithelial lesion or malignancy  Electronically signed by Edyta Mar CT (ASCP) on 6/7/2017 at  2:57 PM      HPV 16 DNA Negative Negative  Negative     HPV 18 DNA Negative Negative  Negative     Other HR HPV Negative Negative  Negative             4/9/2025   Contraception/Family Planning   Questions about contraception or family planning No        Reviewed and updated as needed this visit by Provider                  ROS  Comprehensive 12-point review of systems was completed and negative except as noted in HPI.       Objective    Exam  BP (!) 136/96 (BP Location: Right arm, Patient Position: Sitting)   Pulse 84   Temp 97.8  F (36.6  C)   Resp 16   Ht 1.753 m (5' 9\")   Wt 84.4 kg (186 lb)   LMP 04/06/2025   SpO2 99%   BMI 27.47 kg/m     Estimated body mass index is 27.47 kg/m  as calculated from the following:    Height as of this encounter: 1.753 m (5' 9\").    Weight as of this encounter: 84.4 kg (186 lb).    Physical Exam  Constitutional: In no acute distress.  Clean appearance.  Ears: Bilateral TMs are intact without any erythema or effusion.  Grossly normal hearing.  Oropharynx: Normal mucosa.  Dentition and gingiva is appropriate.  Posterior oropharynx without any abnormalities.  Neck: Supple.  Trachea is midline.  No thyromegaly.  Neck is without tenderness or masses.  Cardiovascular: Regular rate and rhythm.  Normal peripheral perfusion.   Respiratory: Lungs are clear bilaterally.  Normal respiratory effort.  Skin: Skin is without significant rashes or lesions.  No suspicious moles.  Gastrointestinal: Soft and flat.  Normal bowel sounds.  Nontender throughout upon palpation.  No organomegaly or masses.  Negative for CVA tenderness.  Musculoskeletal: Normal range of motion of extremities.  Gait normal.  Able to mount exam table without difficulties.  Psychiatric: Appropriate affect and demeanor.  " Memory intact.  Good insight and judgment.  Neurologic: Sensation and temperature of extremities appropriate.  No tremor or involuntary movement noted.          Signed Electronically by: Marbella Gutierrez NP

## 2025-04-15 ENCOUNTER — PATIENT OUTREACH (OUTPATIENT)
Dept: CARE COORDINATION | Facility: CLINIC | Age: 35
End: 2025-04-15
Payer: COMMERCIAL

## 2025-04-28 ENCOUNTER — HOSPITAL ENCOUNTER (OUTPATIENT)
Dept: MAMMOGRAPHY | Facility: CLINIC | Age: 35
Discharge: HOME | End: 2025-04-28
Attending: NURSE PRACTITIONER | Admitting: NURSE PRACTITIONER
Payer: COMMERCIAL

## 2025-04-28 DIAGNOSIS — Z80.3 FAMILY HISTORY OF MALIGNANT NEOPLASM OF BREAST: ICD-10-CM

## 2025-04-28 DIAGNOSIS — Z12.31 VISIT FOR SCREENING MAMMOGRAM: ICD-10-CM

## 2025-04-28 PROCEDURE — 77063 BREAST TOMOSYNTHESIS BI: CPT

## 2025-05-01 ENCOUNTER — TRANSFERRED RECORDS (OUTPATIENT)
Dept: HEALTH INFORMATION MANAGEMENT | Facility: CLINIC | Age: 35
End: 2025-05-01
Payer: COMMERCIAL

## 2025-05-12 ENCOUNTER — LAB (OUTPATIENT)
Dept: LAB | Facility: CLINIC | Age: 35
End: 2025-05-12
Payer: COMMERCIAL

## 2025-05-12 DIAGNOSIS — R79.89 ELEVATED PROLACTIN LEVEL: ICD-10-CM

## 2025-05-12 LAB — PROLACTIN SERPL 3RD IS-MCNC: 11 NG/ML (ref 5–23)

## 2025-05-12 PROCEDURE — 84146 ASSAY OF PROLACTIN: CPT

## 2025-05-12 PROCEDURE — 36415 COLL VENOUS BLD VENIPUNCTURE: CPT

## 2025-05-13 ENCOUNTER — RESULTS FOLLOW-UP (OUTPATIENT)
Dept: FAMILY MEDICINE | Facility: CLINIC | Age: 35
End: 2025-05-13

## 2025-06-10 ENCOUNTER — TRANSFERRED RECORDS (OUTPATIENT)
Dept: HEALTH INFORMATION MANAGEMENT | Facility: CLINIC | Age: 35
End: 2025-06-10
Payer: COMMERCIAL

## 2025-06-11 ENCOUNTER — TRANSCRIBE ORDERS (OUTPATIENT)
Dept: OTHER | Age: 35
End: 2025-06-11

## 2025-06-11 DIAGNOSIS — H20.9 IRITIS: Primary | ICD-10-CM

## 2025-06-12 ENCOUNTER — PATIENT OUTREACH (OUTPATIENT)
Dept: CARE COORDINATION | Facility: CLINIC | Age: 35
End: 2025-06-12
Payer: COMMERCIAL

## 2025-06-16 ENCOUNTER — PATIENT OUTREACH (OUTPATIENT)
Dept: CARE COORDINATION | Facility: CLINIC | Age: 35
End: 2025-06-16
Payer: COMMERCIAL

## 2025-07-21 ENCOUNTER — OFFICE VISIT (OUTPATIENT)
Dept: OPHTHALMOLOGY | Facility: CLINIC | Age: 35
End: 2025-07-21
Attending: OPTOMETRIST
Payer: COMMERCIAL

## 2025-07-21 DIAGNOSIS — H20.022 RECURRENT IRITIS OF LEFT EYE: Primary | ICD-10-CM

## 2025-07-21 DIAGNOSIS — Z98.890 HX OF LASIK: ICD-10-CM

## 2025-07-21 PROCEDURE — 99203 OFFICE O/P NEW LOW 30 MIN: CPT | Performed by: OPHTHALMOLOGY

## 2025-07-21 PROCEDURE — 99213 OFFICE O/P EST LOW 20 MIN: CPT | Performed by: OPHTHALMOLOGY

## 2025-07-21 RX ORDER — VALACYCLOVIR HYDROCHLORIDE 500 MG/1
500 TABLET, FILM COATED ORAL 3 TIMES DAILY
Qty: 90 TABLET | Refills: 1 | Status: SHIPPED | OUTPATIENT
Start: 2025-07-21

## 2025-07-21 RX ORDER — BRIMONIDINE TARTRATE AND TIMOLOL MALEATE 2; 5 MG/ML; MG/ML
SOLUTION OPHTHALMIC DAILY
COMMUNITY
Start: 2025-06-10

## 2025-07-21 RX ORDER — PREDNISOLONE ACETATE 10 MG/ML
SUSPENSION/ DROPS OPHTHALMIC
COMMUNITY
Start: 2025-07-11

## 2025-07-21 ASSESSMENT — CONF VISUAL FIELD
OD_SUPERIOR_TEMPORAL_RESTRICTION: 0
OS_INFERIOR_NASAL_RESTRICTION: 0
OS_INFERIOR_TEMPORAL_RESTRICTION: 0
OS_SUPERIOR_TEMPORAL_RESTRICTION: 0
OS_SUPERIOR_NASAL_RESTRICTION: 0
OD_NORMAL: 1
OD_INFERIOR_TEMPORAL_RESTRICTION: 0
OD_SUPERIOR_NASAL_RESTRICTION: 0
METHOD: COUNTING FINGERS
OD_INFERIOR_NASAL_RESTRICTION: 0
OS_NORMAL: 1

## 2025-07-21 ASSESSMENT — TONOMETRY
OS_IOP_MMHG: 23
IOP_METHOD: APPLANATION
OS_IOP_MMHG: 21
OD_IOP_MMHG: 17
IOP_METHOD: TONOPEN

## 2025-07-21 ASSESSMENT — CUP TO DISC RATIO
OD_RATIO: 0.3
OS_RATIO: 0.25

## 2025-07-21 ASSESSMENT — VISUAL ACUITY
METHOD: SNELLEN - LINEAR
OS_PH_SC: 20/25
OS_PH_SC+: -2
OS_SC: 20/60 ECC
OD_SC+: +2
OS_SC+: -1
OD_SC: 20/25

## 2025-07-21 ASSESSMENT — EXTERNAL EXAM - RIGHT EYE: OD_EXAM: NORMAL

## 2025-07-21 ASSESSMENT — SLIT LAMP EXAM - LIDS
COMMENTS: NORMAL
COMMENTS: NORMAL

## 2025-07-21 ASSESSMENT — EXTERNAL EXAM - LEFT EYE: OS_EXAM: NORMAL

## 2025-07-21 NOTE — LETTER
7/21/2025       RE: Gwyn Quintanilla  [address] [facility] [address]     Dear Colleague,    Thank you for referring your patient, Gwyn Quintanilla, to the [facility] [place] at [facility]. Please see a copy of my visit note below.    Chief Complaint/Presenting Concern: Uveitis evaluation    History of Present Illness:   Gwyn Quintanilla is a 34 year old patient who presents for evaluation of iritis of the left eye. Gwyn did recall that one time that before the iritis started that she had some swelling of the eyelids and was given antibiotics.The iritis started years ago and has generally responded to drops. There have been some intermittent swelling/puffiness of the upper eyelid on the left eye at times.    There have been recent flares left eye and things got blurry with some aching and things are a bit better now. Given this was the 4th flare of uveitis in the left eye only and labs negative, Dr. Cifuentes requested an evaluation today. There was one episode of stress with one flare previously. Vision does seem to get blurry with flares and then gets better but this time blurriness has not yet improved. No drops used in between flares.     Additional Ocular History:   Ocular hypertension left eye   LASIK surgery left eye   Pupil size does change with iritis flares  There are times when the vision goes dim for seconds when looking far to the left side.   Two episodes of zig zags in the vision left eye. First was  after father's heart attack.  Freckle/lattice back of the left eye     Relevant Past Medical/Family/Social History:  Saw Rheumatology and labs including HLA B27 which was negative. No systemic  Gestational Hypertension and pre-eclampsia with first pregnancy.   Gwyn did recall taking Letrozole years ago for fertility support., but iritis continued to happen even off this medication.   Did have a rash with first episode of iritis on  back of neck  Anxiety well managed without medications.  Did have chicken pox as child    Father with Crohn's but no known iritis. 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  Some ankle clicking and joint stiffness at times  Lip sore but no cold sores. The spot doesn't hurt. It doesn't change, no changes.    Laboratory Testing  2023: Normal/negative: B27, ACE  2022: Normal/negative: Treponema  2019:Normal/negative: HLA B27, Quantiferon, Treponema, IAN, ANCA, Lyme, RF, CCP     Current eye related medications: Brimonidine/timolol 2x/day left eye Prednisolone 3x/day left eye     No Imaging     Assessment:    1. Recurrent iritis of left eye (Primary)  Subtle today    2. Hx of LASIK - Both Eyes  Without epi ingrowth    Plan/Recommendations:  Discussed findings with patient. The right eye has some mild inflammation and some blurriness but only subtle changes to lens and healthy central dilated exam (other than peripheral changes related to nearsightedness). There are no obvious signs of viral uveitis, but given unilateral recurrences, IOP and pupil changes (even without cold sores), we elected to try a course of oral antiviral along with steroid drops  The right eye has a few cells but no symptoms after initial eye pressure check. No drops needed right eye   Eye pressure is  17,21. Optic nerves are healthy and pressure drop not used this AM. We should continue drops for pressure lowering  Recommend additional diagnostic testing: None other at this time as no plans/urgency to start immune suppression given flares have responded to drops and not been needed in between them ( as well as no systemic inflammatory disease)  Continue Brimonidine/timolol but go up 3x/day today and the next 3 days, then back to 2x/day until next visit  For the steroid drop, Prednisolone, let's go up to 4x/day in the left eye today and 3 more days (through  Thursday, 7/24). Then on Friday, 7/25/25, then go back to 3x/day until next visit  Add a course of antiviral pills called Valtrex (Valacyclovir). Take 1 pill (500 mg) three times daily with food for 3 weeks, then once daily pending our time for appointment  For the right eye, no drops needed now    RTC Wed 8/6, Friday, 8/8 or Wed 8/13 tonopen, Refract if interest, no dilation, no testing    Physician Attestation    Attending Physician Attestation:  Complete documentation of historical and exam elements from today's encounter can be found in the full encounter summary report (not reduplicated in this progress note). I personally obtained the chief complaint(s) and history of present illness. I confirmed and edited as necessary the review of systems, past medical/surgical history, family history, social history, and examination findings as documented by others; and I examined the patient myself. I personally reviewed the relevant tests, images, and reports as documented above. I formulated and edited as necessary the assessment and plan and discussed the findings and management plan with the patient and any family members present at the time of the visit.  Juan Luis Hung M.D., Uveitis and Medical Retina, July 21, 2025     Again, thank you for allowing me to participate in the care of your patient.      Sincerely,    Juan Luis Hung MD  Uveitis and Medical Retina    Department of Ophthalmology & Visual Neurosciences  [facility]

## 2025-07-21 NOTE — PATIENT INSTRUCTIONS
Continue Brimonidine/timolol but go up 3x/day today and the next 3 days, then back to 2x/day until next visit  For the steroid drop, Prednisolone, let's go up to 4x/day in the left eye today and 3 more days (through Thursday, 7/24). Then on Friday, 7/25/25, then go back to 3x/day until next visit  Add a course of antiviral pills called Valtrex (Valacyclovir). Take 1 pill (500 mg) three times daily with food for 3 weeks, then once daily pending our time for appointment  For the right eye, no drops needed now

## 2025-07-21 NOTE — PROGRESS NOTES
Chief Complaint/Presenting Concern: Uveitis evaluation    History of Present Illness:   Gwyn Quintanilla is a 34 year old patient who presents for evaluation of iritis of the left eye. Gwyn did recall that one time that before the iritis started that she had some swelling of the eyelids and was given antibiotics.The iritis started years ago and has generally responded to drops. There have been some intermittent swelling/puffiness of the upper eyelid on the left eye at times.    There have been recent flares left eye and things got blurry with some aching and things are a bit better now. Given this was the 4th flare of uveitis in the left eye only and labs negative, Dr. Cifuentes requested an evaluation today. There was one episode of stress with one flare previously. Vision does seem to get blurry with flares and then gets better but this time blurriness has not yet improved. No drops used in between flares.     Additional Ocular History:   Ocular hypertension left eye   LASIK surgery left eye   Pupil size does change with iritis flares  There are times when the vision goes dim for seconds when looking far to the left side.   Two episodes of zig zags in the vision left eye. First was  after Father's heart attack.  Freckle/lattice back of the left eye     Relevant Past Medical/Family/Social History:  Saw Rheumatology and labs including HLA B27 which was negative. No systemic  Gestational Hypertension and pre-eclampsia with first pregnancy.   Gwyn did recall taking Letrozole years ago for fertility support., but iritis continued to happen even off this medication.   Did have a rash with first episode of iritis on back of neck  Anxiety well managed without medications.  Did have chicken pox as child    Father with Crohn's but no known iritis. 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  Some ankle clicking and joint stiffness  at times  Lip sore but no cold sores. The spot doesn't hurt. It doesn't change, no changes.    Laboratory Testing  2023: Normal/negative: B27, ACE  2022: Normal/negative: Treponema  2019:Normal/negative: HLA B27, Quantiferon, Treponema, IAN, ANCA, Lyme, RF, CCP     Current eye related medications: Brimonidine/timolol 2x/day left eye Prednisolone 3x/day left eye     No Imaging     Assessment:    1. Recurrent iritis of left eye (Primary)  Subtle today    2. Hx of LASIK - Both Eyes  Without epi ingrowth    Plan/Recommendations:  Discussed findings with patient. The right eye has some mild inflammation and some blurriness but only subtle changes to lens and healthy central dilated exam (other than peripheral changes related to nearsightedness). There are no obvious signs of viral uveitis, but given unilateral recurrences, IOP and pupil changes (even without cold sores), we elected to try a course of oral antiviral along with steroid drops  The right eye has a few cells but no symptoms after initial eye pressure check. No drops needed right eye   Eye pressure is  17,21. Optic nerves are healthy and pressure drop not used this AM. We should continue drops for pressure lowering  Recommend additional diagnostic testing: None other at this time as no plans/urgency to start immune suppression given flares have responded to drops and not been needed in between them ( as well as no systemic inflammatory disease)  Continue Brimonidine/timolol but go up 3x/day today and the next 3 days, then back to 2x/day until next visit  For the steroid drop, Prednisolone, let's go up to 4x/day in the left eye today and 3 more days (through Thursday, 7/24). Then on Friday, 7/25/25, then go back to 3x/day until next visit  Add a course of antiviral pills called Valtrex (Valacyclovir). Take 1 pill (500 mg) three times daily with food for 3 weeks, then once daily pending our time for appointment  For the right eye, no drops needed now    RTC Wed  8/6, Friday, 8/8 or Wed 8/13 tonopen, Refract if interest, no dilation, no testing    Physician Attestation     Attending Physician Attestation:  Complete documentation of historical and exam elements from today's encounter can be found in the full encounter summary report (not reduplicated in this progress note). I personally obtained the chief complaint(s) and history of present illness. I confirmed and edited as necessary the review of systems, past medical/surgical history, family history, social history, and examination findings as documented by others; and I examined the patient myself. I personally reviewed the relevant tests, images, and reports as documented above. I formulated and edited as necessary the assessment and plan and discussed the findings and management plan with the patient and any family members present at the time of the visit.  Juan Luis Hung M.D., Uveitis and Medical Retina, July 21, 2025

## 2025-07-24 ENCOUNTER — MYC MEDICAL ADVICE (OUTPATIENT)
Dept: OPHTHALMOLOGY | Facility: CLINIC | Age: 35
End: 2025-07-24
Payer: COMMERCIAL

## 2025-07-24 NOTE — TELEPHONE ENCOUNTER
Called and left message asking Gwyn to mychart us back with more information on the symptoms.

## 2025-07-30 ENCOUNTER — OFFICE VISIT (OUTPATIENT)
Dept: OPHTHALMOLOGY | Facility: CLINIC | Age: 35
End: 2025-07-30
Attending: OPHTHALMOLOGY
Payer: COMMERCIAL

## 2025-07-30 DIAGNOSIS — H20.022 RECURRENT IRITIS OF LEFT EYE: Primary | ICD-10-CM

## 2025-07-30 DIAGNOSIS — H20.00 ACUTE IRITIS OF RIGHT EYE: ICD-10-CM

## 2025-07-30 PROCEDURE — 99214 OFFICE O/P EST MOD 30 MIN: CPT | Performed by: OPHTHALMOLOGY

## 2025-07-30 RX ORDER — IBUPROFEN 600 MG/1
600 TABLET, FILM COATED ORAL PRN
COMMUNITY

## 2025-07-30 ASSESSMENT — CONF VISUAL FIELD
OS_INFERIOR_NASAL_RESTRICTION: 0
OS_SUPERIOR_NASAL_RESTRICTION: 0
OD_SUPERIOR_NASAL_RESTRICTION: 0
OD_INFERIOR_TEMPORAL_RESTRICTION: 0
OD_SUPERIOR_TEMPORAL_RESTRICTION: 0
OS_INFERIOR_TEMPORAL_RESTRICTION: 0
OS_NORMAL: 1
OS_SUPERIOR_TEMPORAL_RESTRICTION: 0
METHOD: COUNTING FINGERS
OD_NORMAL: 1
OD_INFERIOR_NASAL_RESTRICTION: 0

## 2025-07-30 ASSESSMENT — EXTERNAL EXAM - LEFT EYE: OS_EXAM: NORMAL

## 2025-07-30 ASSESSMENT — VISUAL ACUITY
OS_PH_SC: 20/25
OD_SC+: -2
METHOD_MR: DIAGNOSTIC MRX
OS_SC: 20/80
METHOD: SNELLEN - LINEAR
OS_SC+: -1
OD_SC: 20/25
OS_PH_SC+: -2

## 2025-07-30 ASSESSMENT — CUP TO DISC RATIO
OD_RATIO: 0.3
OS_RATIO: 0.25

## 2025-07-30 ASSESSMENT — REFRACTION_MANIFEST
OD_CYLINDER: +0.50
OD_SPHERE: -1.50
OS_CYLINDER: +1.00
OD_AXIS: 060
OS_AXIS: 110
OS_SPHERE: -3.25

## 2025-07-30 ASSESSMENT — SLIT LAMP EXAM - LIDS
COMMENTS: NORMAL
COMMENTS: NORMAL

## 2025-07-30 ASSESSMENT — TONOMETRY
IOP_METHOD: TONOPEN
OD_IOP_MMHG: 10
OS_IOP_MMHG: 20

## 2025-07-30 ASSESSMENT — EXTERNAL EXAM - RIGHT EYE: OD_EXAM: NORMAL

## 2025-07-30 NOTE — LETTER
7/30/2025       RE: Gwyn Quintanilla  [address] [facility] [address]     Dear Colleague,    Thank you for referring your patient, Gwyn Quintanilla, to the [facility] [place] at [facility]. Please see a copy of my visit note below.    Chief Complaint/Presenting Concern: Uveitis follow up    Interval History of Present Ocular Illness:  Gwyn Quintanilla is a 34 year old patient who presents for follow up for iritis left eye and new symptoms right eye    Gwyn was last seen on 7/21/25 for evaluation of recurrent iritis of the left eye and we recommended a course of Valtrex. On 7/24/25, Gwyn developed a dull ache under her right eye with mild light sensitivity. We recommended a course of drops right eye and things got better.    Gwyn notes Valtrex has been feeling generally unwell (nausea, diarrhea, headaches, rash). Things got blurrier left eye on Valtrex and vision not back to normal.    Interval Updates to Medical/Family/Social History:  As below    Relevant Review of Systems Updates:    Endorses nausea, headaches/migraines, lightheadedness (even some challenges with coordination), rash, diarrhea, general unsteadiness since starting Valtrex. Self discontinued on Monday 7/28/25 with some persistence since    Laboratory Testing: No new testing since last visit     Current eye related medications:  Brimonidine/timolol 2x/day left eye Prednisolone 3x/day BOTH EYES,. Valtrex 500 mg 3x/day (self discontinued on 7/28/25)        Assessment:     1. Recurrent iritis of left eye (Primary)  Improving     2. Acute iritis of right eye  Some symptoms and few cells today    Plan/Recommendations:    Discussed findings with patient. The left eye is better but Valtrex was not well tolerated. We can monitor off Valtrex for now. If in the future, flares don't get better with frequent steroid drops, we might consider other oral antivirals (Famciclovir, or  Acyclovir). For now, better to use steroid drops   For the right eye, this likely was some iritis and drops helped. Given some inflammation persists, we will continue a bit longer   Eye pressure is 10, 20.  Pressure drops not used this AM. Recommend continuing pressure lower eye drops left eye only  Recommend additional testing: None at this time  For the right eye: Continue Prednisolone 3x/day today and 2 more days, then 2x/day for 3 days then stop. No pressure lowering drops  For the left eye: Let's increase prednisolone 4x/day today for 2 more days, then 3x/day for 3 days, then 2x/day for 3 days, then 1x/day for 3 days, then stop. Continue Brimonidine-Timolol 2x/day for the full course of drops (Stopping on Jai 8/10/25)  Okay to get glasses prescription and consider contact lens left eye   No more antiviral Valtrex pill    RTC Tuesday, 9/2 latest PM visit 3:30PM Tonopen, no dilation, no testing.     Geneva Coronado MD  Ophthalmology PGY-3  [facility]    Attending Physician Attestation:  Complete documentation of historical and exam elements from today's encounter can be found in the full encounter summary report (not reduplicated in this progress note). I personally obtained the chief complaint(s) and history of present illness. I confirmed and edited as necessary the review of systems, past medical/surgical history, family history, social history, and examination findings as documented by others; and I examined the patient myself. I personally reviewed the relevant tests, images, and reports as documented above. I formulated and edited as necessary the assessment and plan and discussed the findings and management plan with the patient and family.  Juan Luis Hung MD.      Again, thank you for allowing me to participate in the care of your patient.      Sincerely,    Juan Luis Hung MD  Uveitis and Medical Retina    Department of Ophthalmology & Visual  Neurosciences  [facility]

## 2025-07-30 NOTE — LETTER
2025      To Whom It May Concern:    This is a note regarding Gwyn Quintanilla (: 1990). She has an eye condition for which she has been on treatment and developed severe side effects necessitating time off work. Please kindly allow her to stay out of work beginning Monday, 25 through Friday, 25. Please contact us for questions.      Thank you very much,    Juan Luis Hung M.D.  Uveitis and Medical Retina  [facility] Department of Ophthalmology and Visual Neurosciences

## 2025-07-30 NOTE — PATIENT INSTRUCTIONS
For the right eye: Continue Prednisolone 3x/day today and 2 more days, then 2x/day for 3 days then stop. No pressure lowering drops  For the left eye: Let's increase prednisolone 4x/day today for 2 more days, then 3x/day for 3 days, then 2x/day for 3 days, then 1x/day for 3 days, then stop. Continue Brimonidine-Timolol 2x/day for the full course of drops (Stopping on Jai 8/10/25)  Okay to get glasses prescription and consider contact lens left eye   No more antiviral Valtrex pill

## 2025-07-30 NOTE — NURSING NOTE
"Chief Complaints and History of Present Illnesses   Patient presents with    Uveitis Follow-Up     Patient notes she is having so many symptoms from the Valtrex. \"I called in and spoke with Karin. She said to go down to once a day, but I stopped it.\" Patient states vision is good in right eye, stable left eye. Denies changes over the last few weeks. \"I felt like when I was on the Valtrex, my vision was filmy, but as soon as I stopped, it went away.\"   Hx Recurrent iritis left eye.      Chief Complaint(s) and History of Present Illness(es)       Uveitis Follow-Up              Laterality: both eyes    Onset: weeks ago    Course: stable    Associated symptoms: eye pain (OS), headache (\"I had the worst migraine; I don't know if it was my left eye or just a horrible migraine.\") and floaters (not new).  Negative for redness, tearing, photophobia and flashes    Treatments tried: eye drops    Comments: Patient notes she is having so many symptoms from the Valtrex. \"I called in and spoke with Karin. She said to go down to once a day, but I stopped it.\" Patient states vision is good in right eye, stable left eye. Denies changes over the last few weeks. \"I felt like when I was on the Valtrex, my vision was filmy, but as soon as I stopped, it went away.\"   Hx Recurrent iritis left eye.               Comments    Ocular meds:   Prednisolone TID each eye   Combigan BID left eye   \"I have not used any drops yet today.\"     GUILLERMO Harley 7:44 AM 07/30/2025                     "

## 2025-07-30 NOTE — PROGRESS NOTES
Chief Complaint/Presenting Concern: Uveitis follow up    Interval History of Present Ocular Illness:  Gwyn Quintanilla is a 34 year old patient who presents for follow up for iritis left eye and new symptoms right eye    Gwyn was last seen on 7/21/25 for evaluation of recurrent iritis of the left eye and we recommended a course of Valtrex. On 7/24/25, Gwyn developed a dull ache under her right eye with mild light sensitivity. We recommended a course of drops right eye and things got better.    Gwyn notes Valtrex has been feeling generally unwell (nausea, diarrhea, headaches, rash). Things got blurrier left eye on Valtrex and vision not back to normal.    Interval Updates to Medical/Family/Social History:  As below    Relevant Review of Systems Updates:    Endorses nausea, headaches/migraines, lightheadedness (even some challenges with coordination), rash, diarrhea, general unsteadiness since starting Valtrex. Self discontinued on Monday 7/28/25 with some persistence since    Laboratory Testing: No new testing since last visit     Current eye related medications:  Brimonidine/timolol 2x/day left eye Prednisolone 3x/day BOTH EYES,. Valtrex 500 mg 3x/day (self discontinued on 7/28/25)    Assessment:     1. Recurrent iritis of left eye (Primary)  Improving     2. Acute iritis of right eye  Some symptoms and few cells today    Plan/Recommendations:    Discussed findings with patient. The left eye is better but Valtrex was not well tolerated. We can monitor off Valtrex for now. If in the future, flares don't get better with frequent steroid drops, we might consider other oral antivirals (Famciclovir, or Acyclovir). For now, better to use steroid drops   For the right eye, this likely was some iritis and drops helped. Given some inflammation persists, we will continue a bit longer   Eye pressure is 10, 20.  Pressure drops not used this AM. Recommend continuing pressure lower eye drops left eye only  Recommend additional  testing: None at this time  For the right eye: Continue Prednisolone 3x/day today and 2 more days, then 2x/day for 3 days then stop. No pressure lowering drops  For the left eye: Let's increase prednisolone 4x/day today for 2 more days, then 3x/day for 3 days, then 2x/day for 3 days, then 1x/day for 3 days, then stop. Continue Brimonidine-Timolol 2x/day for the full course of drops (Stopping on Jai 8/10/25)  Okay to get glasses prescription and consider contact lens left eye   No more antiviral Valtrex pill    RTC Tuesday, 9/2 latest PM visit 3:30PM Tonopen, no dilation, no testing.     Geneva Coronado MD  Ophthalmology PGY-3  [facility]    Attending Physician Attestation:  Complete documentation of historical and exam elements from today's encounter can be found in the full encounter summary report (not reduplicated in this progress note). I reviewed the chief complaint(s) and history of present illness, and  confirmed and edited as necessary the review of systems, past medical/surgical history, family history, social history, and examination findings as documented by others and the treating Resident or Fellow Physician.    I examined the patient myself, discussed the findings, reviewed all ancillary testing data and modified these results and reports along with the assessment and plan with the Treating Resident or Fellow Physician. I agree with the note as detailed above.   Juan Luis Hung M.D.  Uveitis and Medical Retina  July 30, 2025

## 2025-09-02 ENCOUNTER — OFFICE VISIT (OUTPATIENT)
Dept: FAMILY MEDICINE | Facility: CLINIC | Age: 35
End: 2025-09-02
Payer: COMMERCIAL

## 2025-09-02 ENCOUNTER — OFFICE VISIT (OUTPATIENT)
Dept: OPHTHALMOLOGY | Facility: CLINIC | Age: 35
End: 2025-09-02
Attending: OPHTHALMOLOGY
Payer: COMMERCIAL

## 2025-09-02 VITALS
BODY MASS INDEX: 26.74 KG/M2 | WEIGHT: 186.8 LBS | RESPIRATION RATE: 16 BRPM | HEIGHT: 70 IN | SYSTOLIC BLOOD PRESSURE: 116 MMHG | HEART RATE: 82 BPM | OXYGEN SATURATION: 98 % | TEMPERATURE: 98 F | DIASTOLIC BLOOD PRESSURE: 70 MMHG

## 2025-09-02 DIAGNOSIS — H20.022 RECURRENT IRITIS OF LEFT EYE: Primary | ICD-10-CM

## 2025-09-02 DIAGNOSIS — H40.042 STEROID RESPONDER, LEFT EYE: ICD-10-CM

## 2025-09-02 DIAGNOSIS — H20.022 RECURRENT IRITIS OF LEFT EYE: ICD-10-CM

## 2025-09-02 DIAGNOSIS — N64.4 BREAST PAIN, LEFT: ICD-10-CM

## 2025-09-02 DIAGNOSIS — H20.00 ACUTE IRITIS OF RIGHT EYE: ICD-10-CM

## 2025-09-02 DIAGNOSIS — F41.9 ANXIETY: Primary | ICD-10-CM

## 2025-09-02 PROCEDURE — 99213 OFFICE O/P EST LOW 20 MIN: CPT | Performed by: OPHTHALMOLOGY

## 2025-09-02 PROCEDURE — 3074F SYST BP LT 130 MM HG: CPT | Performed by: FAMILY MEDICINE

## 2025-09-02 PROCEDURE — 3078F DIAST BP <80 MM HG: CPT | Performed by: FAMILY MEDICINE

## 2025-09-02 PROCEDURE — 1125F AMNT PAIN NOTED PAIN PRSNT: CPT | Performed by: FAMILY MEDICINE

## 2025-09-02 PROCEDURE — 99214 OFFICE O/P EST MOD 30 MIN: CPT | Performed by: FAMILY MEDICINE

## 2025-09-02 RX ORDER — SERTRALINE HYDROCHLORIDE 25 MG/1
25 TABLET, FILM COATED ORAL DAILY
Qty: 30 TABLET | Refills: 2 | Status: SHIPPED | OUTPATIENT
Start: 2025-09-02

## 2025-09-02 RX ORDER — LOTEPREDNOL ETABONATE 2.5 MG/ML
2 SUSPENSION/ DROPS OPHTHALMIC 2 TIMES DAILY
COMMUNITY
Start: 2025-08-18

## 2025-09-02 ASSESSMENT — CONF VISUAL FIELD
OS_SUPERIOR_TEMPORAL_RESTRICTION: 0
OD_SUPERIOR_NASAL_RESTRICTION: 0
METHOD: COUNTING FINGERS
OS_INFERIOR_TEMPORAL_RESTRICTION: 0
OD_NORMAL: 1
OD_INFERIOR_NASAL_RESTRICTION: 0
OS_SUPERIOR_NASAL_RESTRICTION: 0
OD_INFERIOR_TEMPORAL_RESTRICTION: 0
OS_INFERIOR_NASAL_RESTRICTION: 0
OD_SUPERIOR_TEMPORAL_RESTRICTION: 0
OS_NORMAL: 1

## 2025-09-02 ASSESSMENT — ANXIETY QUESTIONNAIRES
GAD7 TOTAL SCORE: 3
3. WORRYING TOO MUCH ABOUT DIFFERENT THINGS: SEVERAL DAYS
5. BEING SO RESTLESS THAT IT IS HARD TO SIT STILL: NOT AT ALL
7. FEELING AFRAID AS IF SOMETHING AWFUL MIGHT HAPPEN: NOT AT ALL
1. FEELING NERVOUS, ANXIOUS, OR ON EDGE: SEVERAL DAYS
GAD7 TOTAL SCORE: 3
IF YOU CHECKED OFF ANY PROBLEMS ON THIS QUESTIONNAIRE, HOW DIFFICULT HAVE THESE PROBLEMS MADE IT FOR YOU TO DO YOUR WORK, TAKE CARE OF THINGS AT HOME, OR GET ALONG WITH OTHER PEOPLE: SOMEWHAT DIFFICULT
6. BECOMING EASILY ANNOYED OR IRRITABLE: NOT AT ALL
4. TROUBLE RELAXING: NOT AT ALL
7. FEELING AFRAID AS IF SOMETHING AWFUL MIGHT HAPPEN: NOT AT ALL
GAD7 TOTAL SCORE: 3
8. IF YOU CHECKED OFF ANY PROBLEMS, HOW DIFFICULT HAVE THESE MADE IT FOR YOU TO DO YOUR WORK, TAKE CARE OF THINGS AT HOME, OR GET ALONG WITH OTHER PEOPLE?: SOMEWHAT DIFFICULT
2. NOT BEING ABLE TO STOP OR CONTROL WORRYING: SEVERAL DAYS

## 2025-09-02 ASSESSMENT — VISUAL ACUITY
OD_SC+: -1
OS_PH_SC: 20/25
OS_SC+: +1
OD_PH_SC: 20/25
OD_PH_SC+: +1
METHOD: SNELLEN - LINEAR
OS_SC: 20/70
OD_SC: 20/40
OS_PH_SC+: -2

## 2025-09-02 ASSESSMENT — PAIN SCALES - GENERAL: PAINLEVEL_OUTOF10: MILD PAIN (1)

## 2025-09-02 ASSESSMENT — EXTERNAL EXAM - RIGHT EYE: OD_EXAM: NORMAL

## 2025-09-02 ASSESSMENT — PATIENT HEALTH QUESTIONNAIRE - PHQ9
SUM OF ALL RESPONSES TO PHQ QUESTIONS 1-9: 4
SUM OF ALL RESPONSES TO PHQ QUESTIONS 1-9: 4
10. IF YOU CHECKED OFF ANY PROBLEMS, HOW DIFFICULT HAVE THESE PROBLEMS MADE IT FOR YOU TO DO YOUR WORK, TAKE CARE OF THINGS AT HOME, OR GET ALONG WITH OTHER PEOPLE: NOT DIFFICULT AT ALL

## 2025-09-02 ASSESSMENT — SLIT LAMP EXAM - LIDS
COMMENTS: NORMAL
COMMENTS: NORMAL

## 2025-09-02 ASSESSMENT — CUP TO DISC RATIO
OS_RATIO: 0.25
OD_RATIO: 0.3

## 2025-09-02 ASSESSMENT — TONOMETRY
OD_IOP_MMHG: 13
OS_IOP_MMHG: 12
IOP_METHOD: TONOPEN

## 2025-09-02 ASSESSMENT — ENCOUNTER SYMPTOMS: EYE PAIN: 1

## 2025-09-02 ASSESSMENT — EXTERNAL EXAM - LEFT EYE: OS_EXAM: NORMAL

## 2025-09-03 ENCOUNTER — PATIENT OUTREACH (OUTPATIENT)
Dept: CARE COORDINATION | Facility: CLINIC | Age: 35
End: 2025-09-03

## 2025-09-03 ENCOUNTER — PATIENT OUTREACH (OUTPATIENT)
Dept: ONCOLOGY | Facility: CLINIC | Age: 35
End: 2025-09-03

## 2025-09-03 ENCOUNTER — ONCOLOGY VISIT (OUTPATIENT)
Dept: RADIATION ONCOLOGY | Facility: HOSPITAL | Age: 35
End: 2025-09-03
Attending: NURSE PRACTITIONER
Payer: COMMERCIAL

## 2025-09-03 VITALS
SYSTOLIC BLOOD PRESSURE: 152 MMHG | HEART RATE: 77 BPM | OXYGEN SATURATION: 99 % | WEIGHT: 188.4 LBS | BODY MASS INDEX: 27.03 KG/M2 | DIASTOLIC BLOOD PRESSURE: 96 MMHG | RESPIRATION RATE: 16 BRPM

## 2025-09-03 DIAGNOSIS — Z91.89 AT HIGH RISK FOR BREAST CANCER: Primary | ICD-10-CM

## 2025-09-03 DIAGNOSIS — Z12.39 BREAST CANCER SCREENING, HIGH RISK PATIENT: ICD-10-CM

## 2025-09-03 DIAGNOSIS — Z80.3 FAMILY HISTORY OF MALIGNANT NEOPLASM OF BREAST: ICD-10-CM

## 2025-09-03 PROCEDURE — 99212 OFFICE O/P EST SF 10 MIN: CPT

## 2025-09-03 ASSESSMENT — PAIN SCALES - GENERAL: PAINLEVEL_OUTOF10: NO PAIN (0)
